# Patient Record
Sex: FEMALE | Race: BLACK OR AFRICAN AMERICAN | NOT HISPANIC OR LATINO | URBAN - METROPOLITAN AREA
[De-identification: names, ages, dates, MRNs, and addresses within clinical notes are randomized per-mention and may not be internally consistent; named-entity substitution may affect disease eponyms.]

---
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
## 2018-06-12 ENCOUNTER — EMERGENCY (EMERGENCY)
Facility: HOSPITAL | Age: 18
LOS: 1 days | Discharge: HOME | End: 2018-06-12
Attending: EMERGENCY MEDICINE | Admitting: EMERGENCY MEDICINE
Payer: MEDICAID

## 2018-06-12 VITALS
RESPIRATION RATE: 18 BRPM | SYSTOLIC BLOOD PRESSURE: 114 MMHG | HEART RATE: 75 BPM | DIASTOLIC BLOOD PRESSURE: 70 MMHG | TEMPERATURE: 98 F | OXYGEN SATURATION: 100 %

## 2018-06-12 PROCEDURE — 99053 MED SERV 10PM-8AM 24 HR FAC: CPT

## 2018-06-12 PROCEDURE — 99283 EMERGENCY DEPT VISIT LOW MDM: CPT | Mod: 25

## 2018-06-12 NOTE — ED ADULT TRIAGE NOTE - CHIEF COMPLAINT QUOTE
C/o wheezing and difficulty breathing x1 week with productive cough and stuffy nose. PMH asthma- home inhalers not working. PMH asthma, anemia. Breathing is equal and unlabored in triage. Speaking in full sentences with no distress.

## 2018-06-13 PROCEDURE — 71046 X-RAY EXAM CHEST 2 VIEWS: CPT | Mod: 26

## 2018-06-13 RX ORDER — LORATADINE 10 MG/1
10 TABLET ORAL DAILY
Qty: 0 | Refills: 0 | Status: DISCONTINUED | OUTPATIENT
Start: 2018-06-13 | End: 2018-06-16

## 2018-06-13 RX ORDER — IPRATROPIUM/ALBUTEROL SULFATE 18-103MCG
3 AEROSOL WITH ADAPTER (GRAM) INHALATION ONCE
Qty: 0 | Refills: 0 | Status: COMPLETED | OUTPATIENT
Start: 2018-06-13 | End: 2018-06-13

## 2018-06-13 RX ORDER — ALBUTEROL 90 UG/1
2 AEROSOL, METERED ORAL
Qty: 16 | Refills: 0 | OUTPATIENT
Start: 2018-06-13 | End: 2018-07-12

## 2018-06-13 RX ORDER — PSEUDOEPHEDRINE HCL 30 MG
30 TABLET ORAL ONCE
Qty: 0 | Refills: 0 | Status: COMPLETED | OUTPATIENT
Start: 2018-06-13 | End: 2018-06-13

## 2018-06-13 RX ADMIN — Medication 30 MILLIGRAM(S): at 02:17

## 2018-06-13 RX ADMIN — Medication 3 MILLILITER(S): at 02:17

## 2018-06-13 NOTE — ED PROVIDER NOTE - MEDICAL DECISION MAKING DETAILS
Cough with h/o asthma: Lungs CTA, comfortable respirations on RA. CXR r/o PNA given duration of Sx. +rhinorrhea. Albuterol PRN. Sudsfed for nasal congestion and anti-histamine for seasonal allergies. f/u PCP.

## 2018-06-13 NOTE — ED ADULT NURSE NOTE - OBJECTIVE STATEMENT
Pt recd A+Ox3. C/o productive cough, stuffy nose, wheezing and difficulty breathing x1 week. [place] and asthma has been flaring up. Home inhalers not relieving SOB or wheezing. Breathing is equal and unlabored on assessment. Awaiting MD bal. Will continue to monitor.

## 2018-06-13 NOTE — ED PROVIDER NOTE - OBJECTIVE STATEMENT
16y/o F with PMHx of asthma (+prior hospitalization, no intubation. On Albuterol inhaler prn), presents to the ED c/o cough x1w with congestion, rhinorrhea, and sore throat. Her cough is intermittently productive. She took DayQuil to some relief, last taken yesterday morning. Pt reports using her Albuterol inhaler qd without relief. She reports her nasal congestion is similar to her regular seasonal allergies. Denies fevers/chills, nausea/vomiting, SOB, any other complaints. NDKA.

## 2019-06-02 ENCOUNTER — EMERGENCY (EMERGENCY)
Facility: HOSPITAL | Age: 19
LOS: 1 days | Discharge: HOME | End: 2019-06-02
Attending: EMERGENCY MEDICINE | Admitting: EMERGENCY MEDICINE
Payer: MEDICAID

## 2019-06-02 VITALS
SYSTOLIC BLOOD PRESSURE: 112 MMHG | OXYGEN SATURATION: 100 % | RESPIRATION RATE: 15 BRPM | DIASTOLIC BLOOD PRESSURE: 69 MMHG | TEMPERATURE: 99 F | HEART RATE: 63 BPM

## 2019-06-02 PROBLEM — J45.909 UNSPECIFIED ASTHMA, UNCOMPLICATED: Chronic | Status: ACTIVE | Noted: 2018-06-13

## 2019-06-02 PROCEDURE — 99283 EMERGENCY DEPT VISIT LOW MDM: CPT | Mod: 25

## 2019-06-02 NOTE — ED PROVIDER NOTE - CLINICAL SUMMARY MEDICAL DECISION MAKING FREE TEXT BOX
18F hx of imperforate hymen presenting with complaint of vaginal pain, will check exam for possible other vaginal issues, if OK patient safe for discharge to outpatient follow up.

## 2019-06-02 NOTE — ED ADULT NURSE NOTE - NSIMPLEMENTINTERV_GEN_ALL_ED
Implemented All Universal Safety Interventions:  [place] to call system. Call bell, personal items and telephone within reach. Instruct patient to call for assistance. Room bathroom lighting operational. Non-slip footwear when patient is off stretcher. Physically safe environment: no spills, clutter or unnecessary equipment. Stretcher in lowest position, wheels locked, appropriate side rails in place.

## 2019-06-02 NOTE — ED PROVIDER NOTE - NS ED ROS FT
ROS:  GENERAL: No fever, no chills  EYES: no change in vision  HEENT: no trouble swallowing, no trouble speaking  CARDIAC: no chest pain  PULMONARY: no cough, no shortness of breath  GI: no abdominal pain, no nausea, no vomiting, no diarrhea, no constipation  : +vaginal mass, No dysuria, no frequency, no change in appearance, or odor of urine  SKIN: no rashes  NEURO: no headache, no weakness  MSK: No joint pain
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
## 2019-06-02 NOTE — ED PROVIDER NOTE - NSFOLLOWUPINSTRUCTIONS_ED_ALL_ED_FT
Follow up with our OB/GYN clinic, call to schedule an appointment. Take acetaminophen (Tylenol) 975mg (3 regular strength tablets or 2 extra strength tablets) as often as every 6 hours as needed for pain. Never take more than 4000mg of acetaminophen/Tylenol in any 24 hour period. Be cautious of over the counter medications as many formularies contain acetaminophen in them. Take ibuprofen (or Motrin) 600mg (3 tablets) up to 4 times per day as needed for pain with food or milk. Return here to the emergency department for any new symptoms of concern such as severe pain, intractable nausea/vomiting, or other new acute worries.

## 2019-06-02 NOTE — ED PROVIDER NOTE - OBJECTIVE STATEMENT
Hx of imperforate hymen presenting for evaluation of vaginal pain, notes she has blue fluid accumulating behind her hymen, had been told by her gyn in the past she would need a procedure to release it but has avoided this but now wants it. Denies fevers, chills, nausea, vomiting, diarrhea, constipation, headache, back pain, or any shortness of breath. Denies any other sx of concern at this time.

## 2019-06-02 NOTE — ED ADULT NURSE NOTE - OBJECTIVE STATEMENT
PT brought into room 16. A&OX4 amb self care female presents to the ed today for pelvic pain, states she was told by her obgyn that her Shameka is unperforated. MD at bedside. Upon exam, blue, emboli is present within her vaginal canal. Patient shameka is dislocated and cervix is present. Awaiting further orders

## 2019-06-02 NOTE — ED PROVIDER NOTE - PHYSICAL EXAMINATION
Gen: NAD, non-toxic, conversational  Eyes: PERRLA, EOMI   HENT: Normocephalic, atraumatic. External ears normal, no rhinorrhea, moist mucous membranes.   CV: RRR, no M/R/G  Resp: CTAB, non-labored, speaking without difficulty on room air  Abd: soft, non tender, non rigid, no guarding or rebound tenderness  Back: No CVAT bilaterally, no midline ttp  Skin: dry, wwp   Neuro: AOx3, speech is fluent and appropriate  Psych: Mood concerned affect euthymic Gen: NAD, non-toxic, conversational  Eyes: PERRLA, EOMI   HENT: Normocephalic, atraumatic. External ears normal, no rhinorrhea, moist mucous membranes.   CV: RRR, no M/R/G  Resp: CTAB, non-labored, speaking without difficulty on room air  Abd: soft, non tender, non rigid, no guarding or rebound tenderness  Back: No CVAT bilaterally, no midline ttp  Pelvic (chaperone: Dr. Art): Spec: cervix is visualized with physiologic discharge. On the 6 o'clock position of the labia minora there is an approximately 2.5 cm long strand with an engorged end that is purple/blue in hue and ttp.   Skin: dry, wwp   Neuro: AOx3, speech is fluent and appropriate  Psych: Mood concerned affect euthymic

## 2019-10-28 ENCOUNTER — APPOINTMENT (OUTPATIENT)
Dept: ORTHOPEDIC SURGERY | Facility: CLINIC | Age: 19
End: 2019-10-28
Payer: MEDICAID

## 2019-10-28 VITALS
BODY MASS INDEX: 28.47 KG/M2 | HEIGHT: 60 IN | DIASTOLIC BLOOD PRESSURE: 68 MMHG | WEIGHT: 145 LBS | HEART RATE: 76 BPM | SYSTOLIC BLOOD PRESSURE: 103 MMHG

## 2019-10-28 DIAGNOSIS — S99.922A UNSPECIFIED INJURY OF LEFT FOOT, INITIAL ENCOUNTER: ICD-10-CM

## 2019-10-28 DIAGNOSIS — S92.902A UNSPECIFIED FRACTURE OF LEFT FOOT, INITIAL ENCOUNTER FOR CLOSED FRACTURE: ICD-10-CM

## 2019-10-28 PROCEDURE — 99203 OFFICE O/P NEW LOW 30 MIN: CPT

## 2019-10-28 PROCEDURE — 73630 X-RAY EXAM OF FOOT: CPT | Mod: LT

## 2019-10-29 ENCOUNTER — FORM ENCOUNTER (OUTPATIENT)
Age: 19
End: 2019-10-29

## 2019-10-29 PROBLEM — S99.922A FOOT INJURY, LEFT, INITIAL ENCOUNTER: Status: ACTIVE | Noted: 2019-10-29

## 2019-10-30 ENCOUNTER — APPOINTMENT (OUTPATIENT)
Dept: CT IMAGING | Facility: CLINIC | Age: 19
End: 2019-10-30
Payer: MEDICAID

## 2019-10-30 ENCOUNTER — OUTPATIENT (OUTPATIENT)
Dept: OUTPATIENT SERVICES | Facility: HOSPITAL | Age: 19
LOS: 1 days | End: 2019-10-30
Payer: SELF-PAY

## 2019-10-30 DIAGNOSIS — Z00.8 ENCOUNTER FOR OTHER GENERAL EXAMINATION: ICD-10-CM

## 2019-10-30 PROCEDURE — 73700 CT LOWER EXTREMITY W/O DYE: CPT | Mod: 26,LT

## 2019-10-30 PROCEDURE — 76376 3D RENDER W/INTRP POSTPROCES: CPT

## 2019-10-30 PROCEDURE — 73700 CT LOWER EXTREMITY W/O DYE: CPT

## 2019-11-04 ENCOUNTER — APPOINTMENT (OUTPATIENT)
Dept: ORTHOPEDIC SURGERY | Facility: CLINIC | Age: 19
End: 2019-11-04
Payer: MEDICAID

## 2019-11-04 DIAGNOSIS — S92.325D NONDISPLACED FRACTURE OF SECOND METATARSAL BONE, LEFT FOOT, SUBSEQUENT ENCOUNTER FOR FRACTURE WITH ROUTINE HEALING: ICD-10-CM

## 2019-11-04 DIAGNOSIS — S99.922D UNSPECIFIED INJURY OF LEFT FOOT, SUBSEQUENT ENCOUNTER: ICD-10-CM

## 2019-11-04 PROCEDURE — 99214 OFFICE O/P EST MOD 30 MIN: CPT

## 2019-11-06 PROBLEM — S92.325D CLOSED NONDISPLACED FRACTURE OF SECOND METATARSAL BONE OF LEFT FOOT WITH ROUTINE HEALING, SUBSEQUENT ENCOUNTER: Status: ACTIVE | Noted: 2019-10-29

## 2019-11-06 PROBLEM — S99.922D FOOT INJURY, LEFT, SUBSEQUENT ENCOUNTER: Status: ACTIVE | Noted: 2019-11-06

## 2019-11-12 ENCOUNTER — APPOINTMENT (OUTPATIENT)
Dept: ORTHOPEDIC SURGERY | Facility: CLINIC | Age: 19
End: 2019-11-12

## 2019-12-02 ENCOUNTER — APPOINTMENT (OUTPATIENT)
Dept: ORTHOPEDIC SURGERY | Facility: CLINIC | Age: 19
End: 2019-12-02

## 2019-12-03 ENCOUNTER — APPOINTMENT (OUTPATIENT)
Dept: ORTHOPEDIC SURGERY | Facility: CLINIC | Age: 19
End: 2019-12-03
Payer: MEDICAID

## 2019-12-03 PROCEDURE — 99214 OFFICE O/P EST MOD 30 MIN: CPT

## 2019-12-03 PROCEDURE — 73630 X-RAY EXAM OF FOOT: CPT | Mod: LT

## 2019-12-30 ENCOUNTER — APPOINTMENT (OUTPATIENT)
Dept: ORTHOPEDIC SURGERY | Facility: CLINIC | Age: 19
End: 2019-12-30

## 2020-01-28 ENCOUNTER — APPOINTMENT (OUTPATIENT)
Dept: ORTHOPEDIC SURGERY | Facility: CLINIC | Age: 20
End: 2020-01-28
Payer: MEDICAID

## 2020-01-28 DIAGNOSIS — S92.322D DISPLACED FRACTURE OF SECOND METATARSAL BONE, LEFT FOOT, SUBSEQUENT ENCOUNTER FOR FRACTURE WITH ROUTINE HEALING: ICD-10-CM

## 2020-01-28 DIAGNOSIS — S92.342D DISPLACED FRACTURE OF FOURTH METATARSAL BONE, LEFT FOOT, SUBSEQUENT ENCOUNTER FOR FRACTURE WITH ROUTINE HEALING: ICD-10-CM

## 2020-01-28 DIAGNOSIS — S92.332D DISPLACED FRACTURE OF THIRD METATARSAL BONE, LEFT FOOT, SUBSEQUENT ENCOUNTER FOR FRACTURE WITH ROUTINE HEALING: ICD-10-CM

## 2020-01-28 PROCEDURE — 73630 X-RAY EXAM OF FOOT: CPT | Mod: LT

## 2020-01-28 PROCEDURE — 99214 OFFICE O/P EST MOD 30 MIN: CPT

## 2020-02-03 ENCOUNTER — APPOINTMENT (OUTPATIENT)
Dept: ORTHOPEDIC SURGERY | Facility: CLINIC | Age: 20
End: 2020-02-03
Payer: MEDICAID

## 2020-04-28 ENCOUNTER — APPOINTMENT (OUTPATIENT)
Dept: ORTHOPEDIC SURGERY | Facility: CLINIC | Age: 20
End: 2020-04-28

## 2020-12-13 ENCOUNTER — LABORATORY RESULT (OUTPATIENT)
Age: 20
End: 2020-12-13

## 2020-12-14 ENCOUNTER — APPOINTMENT (OUTPATIENT)
Dept: OBGYN | Facility: CLINIC | Age: 20
End: 2020-12-14
Payer: MEDICAID

## 2020-12-14 ENCOUNTER — LABORATORY RESULT (OUTPATIENT)
Age: 20
End: 2020-12-14

## 2020-12-14 ENCOUNTER — OUTPATIENT (OUTPATIENT)
Dept: OUTPATIENT SERVICES | Facility: HOSPITAL | Age: 20
LOS: 1 days | End: 2020-12-14
Payer: MEDICAID

## 2020-12-14 VITALS — TEMPERATURE: 97.3 F

## 2020-12-14 DIAGNOSIS — N76.0 ACUTE VAGINITIS: ICD-10-CM

## 2020-12-14 DIAGNOSIS — N94.6 DYSMENORRHEA, UNSPECIFIED: ICD-10-CM

## 2020-12-14 DIAGNOSIS — Z00.00 ENCOUNTER FOR GENERAL ADULT MEDICAL EXAMINATION W/OUT ABNORMAL FINDINGS: ICD-10-CM

## 2020-12-14 DIAGNOSIS — R63.5 ABNORMAL WEIGHT GAIN: ICD-10-CM

## 2020-12-14 DIAGNOSIS — Z80.41 FAMILY HISTORY OF MALIGNANT NEOPLASM OF OVARY: ICD-10-CM

## 2020-12-14 DIAGNOSIS — Z11.3 ENCOUNTER FOR SCREENING FOR INFECTIONS WITH A PREDOMINANTLY SEXUAL MODE OF TRANSMISSION: ICD-10-CM

## 2020-12-14 DIAGNOSIS — N92.0 EXCESSIVE AND FREQUENT MENSTRUATION WITH REGULAR CYCLE: ICD-10-CM

## 2020-12-14 DIAGNOSIS — F17.290 NICOTINE DEPENDENCE, OTHER TOBACCO PRODUCT, UNCOMPLICATED: ICD-10-CM

## 2020-12-14 PROCEDURE — G0463: CPT

## 2020-12-14 PROCEDURE — 87340 HEPATITIS B SURFACE AG IA: CPT

## 2020-12-14 PROCEDURE — 86803 HEPATITIS C AB TEST: CPT

## 2020-12-14 PROCEDURE — 87491 CHLMYD TRACH DNA AMP PROBE: CPT

## 2020-12-14 PROCEDURE — 36415 COLL VENOUS BLD VENIPUNCTURE: CPT | Mod: NC

## 2020-12-14 PROCEDURE — 36514 APHERESIS PLASMA: CPT

## 2020-12-14 PROCEDURE — 87591 N.GONORRHOEAE DNA AMP PROB: CPT

## 2020-12-14 PROCEDURE — 88175 CYTOPATH C/V AUTO FLUID REDO: CPT

## 2020-12-14 PROCEDURE — 86780 TREPONEMA PALLIDUM: CPT

## 2020-12-14 PROCEDURE — 99203 OFFICE O/P NEW LOW 30 MIN: CPT

## 2020-12-14 PROCEDURE — 87389 HIV-1 AG W/HIV-1&-2 AB AG IA: CPT

## 2020-12-15 LAB
C TRACH RRNA SPEC QL NAA+PROBE: DETECTED
C TRACH+GC RRNA SPEC QL PROBE: SIGNIFICANT CHANGE UP
HBV SURFACE AG SER-ACNC: SIGNIFICANT CHANGE UP
HCV AB S/CO SERPL IA: 0.11 S/CO — SIGNIFICANT CHANGE UP (ref 0–0.99)
HCV AB SERPL-IMP: SIGNIFICANT CHANGE UP
HIV 1+2 AB+HIV1 P24 AG SERPL QL IA: SIGNIFICANT CHANGE UP
N GONORRHOEA RRNA SPEC QL NAA+PROBE: SIGNIFICANT CHANGE UP
T PALLIDUM AB TITR SER: NEGATIVE — SIGNIFICANT CHANGE UP

## 2020-12-16 ENCOUNTER — NON-APPOINTMENT (OUTPATIENT)
Age: 20
End: 2020-12-16
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
## 2020-12-16 NOTE — PHYSICAL EXAM
[Appropriately responsive] : appropriately responsive [No Lymphadenopathy] : no lymphadenopathy [Regular Rate Rhythm] : regular rate rhythm [Clear to Auscultation B/L] : clear to auscultation bilaterally [Soft] : soft [Non-tender] : non-tender [Non-distended] : non-distended [No HSM] : No HSM [No Lesions] : no lesions [No Mass] : no mass [Oriented x3] : oriented x3 [Examination Of The Breasts] : a normal appearance [No Discharge] : no discharge [No Masses] : no breast masses were palpable [Labia Majora] : normal [Labia Minora] : normal [Normal] : normal [FreeTextEntry6] : normal exam

## 2020-12-16 NOTE — REVIEW OF SYSTEMS
[Fatigue] : fatigue [Recent Weight Gain (___ Lbs)] : recent [unfilled] ~Ulb weight gain [Abdominal Pain] : abdominal pain [Constipation] : constipation [Fainting] : fainting [History of Anemia] : history of anemia [Negative] : Endocrine [Fever] : no fever [Chills] : no chills [Poor Appetite] : appetite not poor [Night Sweats] : no night sweats [Diarrhea] : diarrhea [Heartburn] : no heartburn [Vomiting] : no vomiting [Melena] : no melena [Bloating] : no bloating [Nausea] : no nausea [Headache] : no headache [Dizziness] : no dizziness [Convulsions] : no convulsions [FreeTextEntry6] : asthma
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
## 2020-12-17 RX ORDER — AZITHROMYCIN 500 MG/1
500 TABLET, FILM COATED ORAL
Qty: 8 | Refills: 0 | Status: ACTIVE | COMMUNITY
Start: 2020-12-16 | End: 1900-01-01

## 2020-12-21 ENCOUNTER — APPOINTMENT (OUTPATIENT)
Dept: OBGYN | Facility: CLINIC | Age: 20
End: 2020-12-21
Payer: MEDICAID

## 2020-12-21 ENCOUNTER — OUTPATIENT (OUTPATIENT)
Dept: OUTPATIENT SERVICES | Facility: HOSPITAL | Age: 20
LOS: 1 days | End: 2020-12-21
Payer: MEDICAID

## 2020-12-21 VITALS — TEMPERATURE: 97.5 F

## 2020-12-21 DIAGNOSIS — A74.9 CHLAMYDIAL INFECTION, UNSPECIFIED: ICD-10-CM

## 2020-12-21 DIAGNOSIS — N76.0 ACUTE VAGINITIS: ICD-10-CM

## 2020-12-21 PROCEDURE — 99213 OFFICE O/P EST LOW 20 MIN: CPT

## 2020-12-21 PROCEDURE — G0463: CPT

## 2020-12-28 ENCOUNTER — APPOINTMENT (OUTPATIENT)
Dept: ULTRASOUND IMAGING | Facility: CLINIC | Age: 20
End: 2020-12-28
Payer: MEDICAID

## 2020-12-28 ENCOUNTER — OUTPATIENT (OUTPATIENT)
Dept: OUTPATIENT SERVICES | Facility: HOSPITAL | Age: 20
LOS: 1 days | End: 2020-12-28
Payer: MEDICAID

## 2020-12-28 ENCOUNTER — RESULT REVIEW (OUTPATIENT)
Age: 20
End: 2020-12-28

## 2020-12-28 DIAGNOSIS — N92.0 EXCESSIVE AND FREQUENT MENSTRUATION WITH REGULAR CYCLE: ICD-10-CM

## 2020-12-28 PROCEDURE — 76856 US EXAM PELVIC COMPLETE: CPT | Mod: 26

## 2020-12-28 PROCEDURE — 76830 TRANSVAGINAL US NON-OB: CPT | Mod: 26

## 2020-12-28 PROCEDURE — 76856 US EXAM PELVIC COMPLETE: CPT

## 2020-12-28 PROCEDURE — 76830 TRANSVAGINAL US NON-OB: CPT

## 2020-12-29 NOTE — HISTORY OF PRESENT ILLNESS
[No] : Patient does not have concerns regarding sex [Currently Active] : currently active [Men] : men [Vaginal] : vaginal [TextBox_4] : POS chlamydia culture   [FreeTextEntry1] : 12/9

## 2020-12-29 NOTE — DISCUSSION/SUMMARY
[FreeTextEntry1] : 21 y/o  [place]  received phone call  re chlamydia culture pos  - Was given azithromyocin pt and partner  \par Has only been taking  1 tab PO qday was confused  \par Pt  instructed  to finish wthe remainder of the 4 tabs today and partner as well \par stressed  the need for condoms  for  Safe sex  \par \par Will start Aviane  OVp for BC w/ next menses  \par \par F/u 6 weks for RUBIN  \par \par IDANIA Su PAC

## 2021-03-24 ENCOUNTER — EMERGENCY (EMERGENCY)
Facility: HOSPITAL | Age: 21
LOS: 1 days | Discharge: HOME | End: 2021-03-24
Attending: EMERGENCY MEDICINE | Admitting: EMERGENCY MEDICINE
Payer: MEDICAID

## 2021-03-24 VITALS
SYSTOLIC BLOOD PRESSURE: 122 MMHG | HEIGHT: 60 IN | DIASTOLIC BLOOD PRESSURE: 69 MMHG | OXYGEN SATURATION: 100 % | HEART RATE: 65 BPM | RESPIRATION RATE: 16 BRPM | TEMPERATURE: 98 F

## 2021-03-24 VITALS
DIASTOLIC BLOOD PRESSURE: 70 MMHG | HEART RATE: 62 BPM | RESPIRATION RATE: 66 BRPM | OXYGEN SATURATION: 99 % | SYSTOLIC BLOOD PRESSURE: 102 MMHG

## 2021-03-24 PROCEDURE — 99284 EMERGENCY DEPT VISIT MOD MDM: CPT

## 2021-03-24 RX ORDER — METOCLOPRAMIDE HCL 10 MG
10 TABLET ORAL ONCE
Refills: 0 | Status: COMPLETED | OUTPATIENT
Start: 2021-03-24 | End: 2021-03-24

## 2021-03-24 RX ORDER — SODIUM CHLORIDE 9 MG/ML
1000 INJECTION INTRAMUSCULAR; INTRAVENOUS; SUBCUTANEOUS ONCE
Refills: 0 | Status: COMPLETED | OUTPATIENT
Start: 2021-03-24 | End: 2021-03-24

## 2021-03-24 RX ORDER — ACETAMINOPHEN 500 MG
975 TABLET ORAL ONCE
Refills: 0 | Status: COMPLETED | OUTPATIENT
Start: 2021-03-24 | End: 2021-03-24

## 2021-03-24 RX ADMIN — SODIUM CHLORIDE 1000 MILLILITER(S): 9 INJECTION INTRAMUSCULAR; INTRAVENOUS; SUBCUTANEOUS at 08:54

## 2021-03-24 RX ADMIN — Medication 975 MILLIGRAM(S): at 08:53

## 2021-03-24 RX ADMIN — Medication 10 MILLIGRAM(S): at 08:54
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
## 2021-03-24 NOTE — ED PROVIDER NOTE - PROGRESS NOTE DETAILS
TYLER Palmer: Pt reassessed, feeling better, headache resolved. Will dc w/ neuro follow up if sx persist.
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
## 2021-03-24 NOTE — ED PROVIDER NOTE - PATIENT PORTAL LINK FT
You can access the FollowMyHealth Patient Portal offered by [facility] by registering at the following website: [email or web address]. By joining [employer]’s FollowMyHealth portal, you will also be able to view your health information using other applications (apps) compatible with our system.

## 2021-03-24 NOTE — ED PROVIDER NOTE - CROS ED NEURO NEG
no numbness, no tingling, and no weakness. no numbness, no tingling, and no weakness./no difficulty walking/imbalance

## 2021-03-24 NOTE — ED PROVIDER NOTE - NSFOLLOWUPINSTRUCTIONS_ED_ALL_ED_FT
Head Injury    WHAT YOU NEED TO KNOW:    A head injury can include your scalp, face, skull, or brain and range from mild to severe. Effects can appear immediately after the injury or develop later. The effects may last a short time or be permanent. Healthcare providers may want to check your recovery over time. Treatment may change as you recover or develop new health problems from the head injury.    DISCHARGE INSTRUCTIONS:    Call your local emergency number (911 in the ), or have someone else call if:   •You cannot be woken.      •You have a seizure.      •You stop responding to others or you faint.      •You have blurry or double vision.      •Your speech becomes slurred or confused.      •You have arm or leg weakness, loss of feeling, or new problems with coordination.      •Your pupils are larger than usual, or one pupil is a different size than the other.      •You have blood or clear fluid coming out of your ears or nose.      Seek care immediately if:   •You have repeated or forceful vomiting.      •You feel confused.      •Your headache gets worse or becomes severe.      •You or someone caring for you notices that you are harder to wake than usual.      Call your doctor if:   •Your symptoms last longer than 6 weeks after the injury.      •You have questions or concerns about your condition or care.      Medicines:   •Acetaminophen decreases pain and fever. It is available without a doctor's order. Ask how much to take and how often to take it. Follow directions. Read the labels of all other medicines you are using to see if they also contain acetaminophen, or ask your doctor or pharmacist. Acetaminophen can cause liver damage if not taken correctly. Do not use more than 4 grams (4,000 milligrams) total of acetaminophen in one day.       •Take your medicine as directed. Contact your healthcare provider if you think your medicine is not helping or if you have side effects. Tell him or her if you are allergic to any medicine. Keep a list of the medicines, vitamins, and herbs you take. Include the amounts, and when and why you take them. Bring the list or the pill bottles to follow-up visits. Carry your medicine list with you in case of an emergency.      Self-care:   •Rest or do quiet activities. Limit your time watching TV, using the computer, or doing tasks that require a lot of thinking. Slowly return to your normal activities as directed. Do not play sports or do activities that may cause you to get hit in the head. Ask your healthcare provider when you can return to sports.      •Apply ice on your head for 15 to 20 minutes every hour or as directed. Use an ice pack, or put crushed ice in a plastic bag. Cover it with a towel before you apply it to your skin. Ice helps prevent tissue damage and decreases swelling and pain.      •Have someone stay with you for 24 hours , or as directed. This person can monitor you for problems and call for help if needed. When you are awake, the person should ask you a few questions every few hours to see if you are thinking clearly. An example is to ask your name or address.      Prevent another head injury:   •Wear a helmet that fits properly. Do this when you play sports, or ride a bike, scooter, or skateboard. Helmets help decrease your risk for a serious head injury. Talk to your healthcare provider about other ways you can protect yourself if you play sports.      •Wear your seatbelt every time you are in a car. This helps lower your risk for a head injury if you are in a car accident.      Follow up with your doctor as directed: Write down your questions so you remember to ask them during your visits. Head Injury     WHAT YOU NEED TO KNOW:    A head injury can include your scalp, face, skull, or brain and range from mild to severe. Effects can appear immediately after the injury or develop later. The effects may last a short time or be permanent. Healthcare providers may want to check your recovery over time. Treatment may change as you recover or develop new health problems from the head injury.    DISCHARGE INSTRUCTIONS:    Call your local emergency number (911 in the ), or have someone else call if:   •You cannot be woken.      •You have a seizure.      •You stop responding to others or you faint.      •You have blurry or double vision.      •Your speech becomes slurred or confused.      •You have arm or leg weakness, loss of feeling, or new problems with coordination.      •Your pupils are larger than usual, or one pupil is a different size than the other.      •You have blood or clear fluid coming out of your ears or nose.      Seek care immediately if:   •You have repeated or forceful vomiting.      •You feel confused.      •Your headache gets worse or becomes severe.      •You or someone caring for you notices that you are harder to wake than usual.      Call your doctor if:   •Your symptoms last longer than 6 weeks after the injury.      •You have questions or concerns about your condition or care.      Medicines:   •Acetaminophen decreases pain and fever. It is available without a doctor's order. Ask how much to take and how often to take it. Follow directions. Read the labels of all other medicines you are using to see if they also contain acetaminophen, or ask your doctor or pharmacist. Acetaminophen can cause liver damage if not taken correctly. Do not use more than 4 grams (4,000 milligrams) total of acetaminophen in one day.       •Take your medicine as directed. Contact your healthcare provider if you think your medicine is not helping or if you have side effects. Tell him or her if you are allergic to any medicine. Keep a list of the medicines, vitamins, and herbs you take. Include the amounts, and when and why you take them. Bring the list or the pill bottles to follow-up visits. Carry your medicine list with you in case of an emergency.      Self-care:   •Rest or do quiet activities. Limit your time watching TV, using the computer, or doing tasks that require a lot of thinking. Slowly return to your normal activities as directed. Do not play sports or do activities that may cause you to get hit in the head. Ask your healthcare provider when you can return to sports.      •Apply ice on your head for 15 to 20 minutes every hour or as directed. Use an ice pack, or put crushed ice in a plastic bag. Cover it with a towel before you apply it to your skin. Ice helps prevent tissue damage and decreases swelling and pain.      •Have someone stay with you for 24 hours , or as directed. This person can monitor you for problems and call for help if needed. When you are awake, the person should ask you a few questions every few hours to see if you are thinking clearly. An example is to ask your name or address.      Prevent another head injury:   •Wear a helmet that fits properly. Do this when you play sports, or ride a bike, scooter, or skateboard. Helmets help decrease your risk for a serious head injury. Talk to your healthcare provider about other ways you can protect yourself if you play sports.      •Wear your seatbelt every time you are in a car. This helps lower your risk for a head injury if you are in a car accident.      Follow up with your doctor as directed: Write down your questions so you remember to ask them during your visits.

## 2021-03-24 NOTE — ED PROVIDER NOTE - CLINICAL SUMMARY MEDICAL DECISION MAKING FREE TEXT BOX
21 y/o F presents with intermittent left-sided headache s/p head injury, likely secondary to concussion. Given normal exam no need for imaging at this time. Plan to treat symptomatically and reassess.

## 2021-09-09 NOTE — ED ADULT NURSE NOTE - NS_SISCREENINGSR_GEN_ALL_ED
PEDIATRIC ILLNESS VISIT   9/9/2021        Roomed by: Veena Lang MD 10:03 AM      SUBJECTIVE   accompanied by father  Verna is a 30 month old female who is complaining of loose stools for two weeks, yellowy in color.  Started with irriated skin at the anal opening, this was noted at school .  Had blow outs at school.    BACKGROUND  August 3, fever runny nose cough.  COVID negative . Was seen in convenient care.  Then she recovered.    Then Hand Foot Mouth  August 16 now fully recovered.   Had diapr rash but now improved.       . Symptoms:  Fever: no fever  Review of Systems   Constitutional: Negative for activity change and appetite change.   HENT: Positive for rhinorrhea.    Eyes: Negative.    Respiratory: Negative.    Cardiovascular: Negative.    Gastrointestinal: Positive for diarrhea (green watery.).     Allergies:  ALLERGIES:  No Known Allergies    Current Outpatient Medications   Medication Sig Dispense Refill   • albuterol (VENTOLIN) (2.5 MG/3ML) 0.083% nebulizer solution Take 3 mLs by nebulization every 4 hours as needed for Wheezing or Shortness of Breath. Please start twice a day to start and then go as needed in between for 5 days 375 mL 11   • sodium chloride 0.9 % nebulizer solution Every 4-6 as needed for congestion 150 mL 5   • permethrin (ACTICIN) 5 % cream Apply and massage in cream from head to toe ; leave on for 8 to 14 hours before washing off with water; can repeat in 14 days if needed 60 g 0   • hydroCORTisone (CORTIZONE) 2.5 % cream Apply Bid prn itchiness 30 g 2     No current facility-administered medications for this visit.       No family history on file.  Family history of lactose intoleance.     Social history:   Attends     OBJECTIVE:   Visit Vitals  Pulse 116   Temp 98.3 °F (36.8 °C) (Temporal)   Resp 24   Wt 14.3 kg (31 lb 9.6 oz)     Physical Exam  Vitals reviewed.   Constitutional:       General: She is active.      Appearance: She is well-developed.   HENT:       Head: Atraumatic.      Right Ear: Tympanic membrane normal.      Left Ear: Tympanic membrane normal.      Nose: Nose normal.      Mouth/Throat:      Mouth: Mucous membranes are moist.      Pharynx: Oropharynx is clear.   Eyes:      Conjunctiva/sclera: Conjunctivae normal.      Pupils: Pupils are equal, round, and reactive to light.   Cardiovascular:      Rate and Rhythm: Normal rate and regular rhythm.      Pulses: Pulses are strong.      Heart sounds: S1 normal and S2 normal. No murmur heard.     Pulmonary:      Effort: Pulmonary effort is normal. No respiratory distress.      Breath sounds: Normal breath sounds.   Abdominal:      General: Abdomen is flat. There is no distension.      Palpations: Abdomen is soft. There is no mass.      Tenderness: There is no abdominal tenderness.      Comments: Increased gas sounds throughout   Genitourinary:     Vagina: No erythema.   Musculoskeletal:         General: No deformity or signs of injury. Normal range of motion.      Cervical back: Normal range of motion.   Lymphadenopathy:      Cervical: No cervical adenopathy.   Skin:     General: Skin is warm.      Capillary Refill: Capillary refill takes less than 2 seconds.   Neurological:      Mental Status: She is alert.      Sensory: No sensory deficit.      Motor: No abnormal muscle tone.      Coordination: Coordination normal.      Deep Tendon Reflexes: Reflexes normal.         ASSESSMENT/PLAN    Diarrhea -  watch for signs of dehydration, trial of probiotics , discussedno diary and to give more of a starchy diet for now.   No orders of the defined types were placed in this encounter.      If not improving in 3 days will have stool cultures ordered.   Medication changes: No    Immunizations given today? No  See Orders:  Instructed to call if the problem worsens or does not improve within the next 24 to 48 hours.    Schedule follow-up: agata Lang MD         Negative

## 2021-11-01 ENCOUNTER — EMERGENCY (EMERGENCY)
Facility: HOSPITAL | Age: 21
LOS: 1 days | Discharge: HOME | End: 2021-11-01
Attending: EMERGENCY MEDICINE | Admitting: EMERGENCY MEDICINE
Payer: MEDICAID

## 2021-11-01 VITALS
TEMPERATURE: 98 F | DIASTOLIC BLOOD PRESSURE: 71 MMHG | SYSTOLIC BLOOD PRESSURE: 123 MMHG | RESPIRATION RATE: 17 BRPM | OXYGEN SATURATION: 98 % | HEART RATE: 78 BPM

## 2021-11-01 VITALS
SYSTOLIC BLOOD PRESSURE: 105 MMHG | HEART RATE: 73 BPM | RESPIRATION RATE: 16 BRPM | HEIGHT: 60 IN | DIASTOLIC BLOOD PRESSURE: 66 MMHG | TEMPERATURE: 98 F

## 2021-11-01 LAB

## 2021-11-01 PROCEDURE — 71046 X-RAY EXAM CHEST 2 VIEWS: CPT | Mod: 26

## 2021-11-01 PROCEDURE — 99284 EMERGENCY DEPT VISIT MOD MDM: CPT

## 2021-11-01 RX ORDER — IPRATROPIUM/ALBUTEROL SULFATE 18-103MCG
3 AEROSOL WITH ADAPTER (GRAM) INHALATION ONCE
Refills: 0 | Status: COMPLETED | OUTPATIENT
Start: 2021-11-01 | End: 2021-11-01

## 2021-11-01 RX ORDER — ALBUTEROL 90 UG/1
2 AEROSOL, METERED ORAL
Qty: 1 | Refills: 0
Start: 2021-11-01 | End: 2021-11-01

## 2021-11-01 RX ADMIN — Medication 3 MILLILITER(S): at 06:26

## 2021-11-01 NOTE — ED ADULT NURSE NOTE - OBJECTIVE STATEMENT
22 y/o female, a&ox4, received to rm 12 with c/o shortness of breath and difficulty breathing for the past week. Pt describes pain as a "feather stuck in her throat that hurts when breathes in." Pmh includes asthma, uses inhaler which provides temporary relief. Pt reports epigastric pain yesterday. Pt denies CP, SOB, and difficulty breathing at moment because she used her inhaler before entering ER. Stable Vs noted. MD Jiang reports equal and clear lung sounds. 20 y/o female, a&ox4, received to rm 12 with c/o shortness of breath and difficulty breathing for the past week. Pt describes pain as a "feather stuck in her throat that hurts when breathes in." MD Jiang reports equal and clear lung sounds. No use of accessory muscles, abdominal breathing or labored breathing on assessment. Pt NSR on cardiac monitor. Pmh includes asthma, uses inhaler which provides temporary relief. Pt reports epigastric pain yesterday. Pt denies CP, SOB, and difficulty breathing at moment because she used her inhaler before entering ER. Stable Vs noted.

## 2021-11-01 NOTE — ED PROVIDER NOTE - ATTENDING CONTRIBUTION TO CARE
I have seen and examined the patient on the patient´s visit date. I have reviewed the note written by Je Jiang DO, on that visit day. I have supervised and participated as necessary in the performance of procedures indicated for patient management and was available at all phases of the patient´s visit when needed. We discussed the history, physical exam findings, management plan, and  medical decision making. I have made my additions, exceptions, and revisions within the chart and I agree with H and P as documented in its entirety. The data and my interpretation of any data collected from labs, interventions and imaging appear below as well as my independent medical decision making and considerations.    The patient is a 21y Female who has a past medical and surgery history of Asthma PTED c/o 1 week of  throat pain SOB and CP w/inspiration without cough sputum  hemoptysis. She denies fever or chills or myalgias    Vital Signs Last 24 Hrs  T(F): 98.1 HR: 73 BP: 105/66 RR: 16 (01 Nov 2021 04:37)   PE: as described; my additions and exceptions are noted in the chart    DATA:  EKG: c/w 2010 EKG   LAB: Pending at time of evaluation    IMPRESSION/RISK:  Dx=  Differential includes but not limited to conditions listed in order of most possible first:   Consideration include  Plan I have seen and examined the patient on the patient´s visit date. I have reviewed the note written by Je Jiang DO, on that visit day. I have supervised and participated as necessary in the performance of procedures indicated for patient management and was available at all phases of the patient´s visit when needed. We discussed the history, physical exam findings, management plan, and  medical decision making. I have made my additions, exceptions, and revisions within the chart and I agree with H and P as documented in its entirety. The data and my interpretation of any data collected from labs, interventions and imaging appear below as well as my independent medical decision making and considerations.    The patient is a 21y unvaccinated Female who has a past medical and surgery history of Asthma PTED c/o 1 week of  throat pain SOB and CP w/inspiration without cough sputum  hemoptysis. She denies fever or chills or myalgias    Vital Signs Last 24 Hrs  T(F): 98.1 HR: 73 BP: 105/66 RR: 16 (01 Nov 2021 04:37)   PE: as described; my additions and exceptions are noted in the chart    IMPRESSION/RISK:  Dx=SOB  Consideration include probable inadequately treated asthma in patient with poor regimen and enviromental status/situation (work in a [employer]); also concern RE vaccination status   Plan  nebs  cxr  reassess  will d/c with followup   RTED PRN

## 2021-11-01 NOTE — ED PROVIDER NOTE - NSFOLLOWUPCLINICS_GEN_ALL_ED_FT
[facility] Pulmonolgy and Sleep Medicine  Pulmonology  [address] [place] [address], [facility] [address]  [place] [address]  Phone: [phone]  Fax:   Follow Up Time: 1-3 Days

## 2021-11-01 NOTE — ED PROVIDER NOTE - PHYSICAL EXAMINATION
GENERAL: Awake, alert, NAD  HEENT: NC/AT, moist mucous membranes, PERRL, EOMI, no pharyngeal exudate, no cervical lymphadenopathy   LUNGS: CTAB, no wheezes or crackles   CARDIAC: RRR, no m/r/g  ABDOMEN: Soft, , non tender, non distended, no rebound, no guarding  EXT: No edema, no calf tenderness,   NEURO: A&Ox3. Moving all extremities.  SKIN: Warm and dry. No rash.  PSYCH: Normal affect. T(C): 36.5 (01 Nov 2021 05:52), Max: 36.7 (01 Nov 2021 04:37)  T(F): 97.7 (01 Nov 2021 05:52), Max: 98.1 (01 Nov 2021 04:37)  HR: 78 (01 Nov 2021 05:52) (73 - 78)  BP: 123/71 (01 Nov 2021 05:52) (105/66 - 123/71)  BP(mean): --  ABP: --  ABP(mean): --  RR: 17 (01 Nov 2021 05:52) (16 - 17)  SpO2: 98% (01 Nov 2021 05:52) (98% - 98%)    GENERAL: Awake, alert, NAD  HEENT: NC/AT, moist mucous membranes, PERRL, EOMI, no pharyngeal exudate, no cervical lymphadenopathy   LUNGS: CTAB, no wheezes or crackles   CARDIAC: RRR, no m/r/g  ABDOMEN: Soft, , non tender, non distended, no rebound, no guarding  EXT: No edema, no calf tenderness,   NEURO: A&Ox3. Moving all extremities.  SKIN: Warm and dry. No rash.  PSYCH: Normal affect.

## 2021-11-01 NOTE — ED PROVIDER NOTE - PROGRESS NOTE DETAILS
pt receiving duoneb currently, states she feels sob is better but it is still not feeling 100% back to normal

## 2021-11-01 NOTE — ED PROVIDER NOTE - PATIENT PORTAL LINK FT
You can access the FollowMyHealth Patient Portal offered by [facility] by registering at the following website: [email or web address]. By joining [facility]’s FollowMyHealth portal, you will also be able to view your health information using other applications (apps) compatible with our system.

## 2021-11-01 NOTE — ED PROVIDER NOTE - CLINICAL SUMMARY MEDICAL DECISION MAKING FREE TEXT BOX
21F PMH Asthma CC SOB Given hx and physical possible viral etiolgy w/ asthma excerbation Will get xray 2 view, give duoneb and urine preg test

## 2021-11-01 NOTE — ED ADULT NURSE NOTE - PAIN: PRESENCE, MLM
denies pain/discomfort [FreeTextEntry1] : He states that his tremor is unchanged since his visit. He is on keppra now per Dr. Koeppel and off vimpat. He is able to use a spoon with his right hand as well as hold a cup without spilling. Shaving remains difficult He recharges his battery every other day \par \par \par Meds\par GBP:400mg TID\par Lev 750mg BID

## 2021-11-01 NOTE — ED PROVIDER NOTE - OBJECTIVE STATEMENT
21F PMH asthma CC sob for the past week. PT states her SOB feels like she is she has to breathe faster. PT states her symptoms started w/ a tickle in her throat, now has been having dry cough. PT takes wixela when she feels the symptoms and states she has to be taking it more frequently. Pt spoke to PCP earlier in the week and was told if symptoms persist, to come to the ED. 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  Chest pain located over sternum, last episode was yesterday, only occurred during inspiration.   Not covid vaccinated  No sick contacts or recent travel   Denies any fever, chills n/v   LMP today 21F PMH asthma CC sob for the past week. PT states her SOB feels like she is she has to breathe faster. PT states her symptoms started w/ a tickle in her throat, now has been having dry cough. PT takes wixela when she feels the symptoms and states she has to be taking it more frequently. Pt spoke to PCP earlier in the week and was told if symptoms persist, to come to the ED. 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  Chest pain located over sternum, last episode was yesterday, only occurred during inspiration.   No pain with swallowing   Not covid vaccinated  No sick contacts or recent travel   Denies any fever, chills n/v   LMP today

## 2021-11-01 NOTE — ED ADULT TRIAGE NOTE - CHIEF COMPLAINT QUOTE
Pt. c/o throat pain and sob and chest discomfort with inspiration x 1 week. Denies cough, body aches or fevers. hx Asthma

## 2021-11-01 NOTE — ED PROVIDER NOTE - NSFOLLOWUPINSTRUCTIONS_ED_ALL_ED_FT
Today you were seen in the ED for shortness of breath    It was found that you have    Please follow up with your PCP or a pulmonologist in regards to the further management of your asthma.     Please return to the ED if you have any of the following: Today you were seen in the ED for shortness of breath    It was found that you have    Please follow up with your PCP or a pulmonologist in regards to the further management of your asthma. The information to make an appointment is below.      Please return to the ED if you have any of the following: Today you were seen in the ED for shortness of breath    It was found that you have no pneumonia, but most likely a asthma exacerbation.     Please follow up with your PCP or a pulmonologist in regards to the further management of your asthma. The information to make an appointment is below.      Please return to the ED if you have any of the following: increase of shortness of breath, inability to take full breaths,    [facility] Internal Medicine  General Internal Medicine  [address] [place] [address]  Phone: [phone]  Fax:     [facility]  Internal Medicine  [address] [place] [address]  Phone: [phone]  Fax: [phone]    Asthma WHAT YOU NEED TO KNOW:    Asthma is a lung disease that makes breathing difficult. Chronic inflammation and reactions to triggers narrow the airways in the lungs. Asthma can become life-threatening if it is not managed.  Normal vs Asthmatic Bronchioles Adult    DISCHARGE INSTRUCTIONS:    Call your local emergency number (911 in the ) if:    You have severe shortness of breath.    The skin around your neck and ribs pulls in with each breath.    Your peak flow numbers are in the red zone of your AAP.  Seek care immediately if:    You have shortness of breath, even after you take your short-term medicine as directed.    Your lips or nails turn blue or gray.  Call your doctor or asthma specialist if:    You run out of medicine before your next refill is due.    Your symptoms get worse.    You need to take more medicine than usual to control your symptoms.    You have questions or concerns about your condition or care.  Medicines:    Medicines may be used to decrease inflammation, open airways, and make it easier to breathe. Medicines may be inhaled, taken as a pill, or injected. Short-term medicines relieve your symptoms quickly. Long-term medicines are used to prevent future asthma attacks. Other medicines may be needed if your regular medicines are not able to prevent attacks. You may also need medicine to help control your allergies. Ask your healthcare provider for more information about any medicine you are given and how to take it safely.    Take your medicine as directed. Contact your healthcare provider if you think your medicine is not helping or if you have side effects. Tell him or her if you are allergic to any medicine. Keep a list of the medicines, vitamins, and herbs you take. Include the amounts, and when and why you take them. Bring the list or the pill bottles to follow-up visits. Carry your medicine list with you in case of an emergency.  Manage your symptoms and prevent future attacks:    Follow your Asthma Action Plan (AAP). This is a written plan that you and your healthcare provider create. It explains which medicine you need and when to change doses if necessary. It also explains how you can monitor symptoms and use a peak flow meter. The meter measures how well your lungs are working.    Manage other health conditions, such as allergies, acid reflux, and sleep apnea.    Identify and avoid triggers. These may include pets, dust mites, mold, and cockroaches.    Do not smoke or be around others who smoke. Nicotine and other chemicals in cigarettes and cigars can cause lung damage. Ask your healthcare provider for information if you currently smoke and need help to quit. E-cigarettes or smokeless tobacco still contain nicotine. Talk to your healthcare provider before you use these products.    Ask about the flu vaccine. The flu can make your asthma worse. You may need a yearly flu shot.  Follow up with your healthcare provider as directed: You will need to return to make sure your medicine is working and your symptoms are controlled. You may be referred to an asthma or allergy specialist. You may be asked to keep a record of your peak flow values and bring it with you to your appointments. Write down your questions so you remember to ask them during your visits.

## 2021-11-02 NOTE — ED POST DISCHARGE NOTE - REASON FOR FOLLOW-UP
Other Telephone follow up  request : Call patient with RVP results. RVP positive for Parainfluenza message left with Call Back  P.A. number and hours for return call back.

## 2021-12-30 ENCOUNTER — EMERGENCY (EMERGENCY)
Facility: HOSPITAL | Age: 21
LOS: 1 days | Discharge: HOME | End: 2021-12-30
Attending: EMERGENCY MEDICINE
Payer: MEDICAID

## 2021-12-30 VITALS
DIASTOLIC BLOOD PRESSURE: 74 MMHG | SYSTOLIC BLOOD PRESSURE: 106 MMHG | HEIGHT: 60 IN | OXYGEN SATURATION: 100 % | HEART RATE: 76 BPM | RESPIRATION RATE: 19 BRPM | WEIGHT: 145.06 LBS | TEMPERATURE: 98 F

## 2021-12-30 VITALS
HEART RATE: 72 BPM | TEMPERATURE: 98 F | OXYGEN SATURATION: 99 % | DIASTOLIC BLOOD PRESSURE: 80 MMHG | RESPIRATION RATE: 20 BRPM | SYSTOLIC BLOOD PRESSURE: 122 MMHG

## 2021-12-30 LAB — SARS-COV-2 RNA SPEC QL NAA+PROBE: DETECTED

## 2021-12-30 PROCEDURE — 99291 CRITICAL CARE FIRST HOUR: CPT | Mod: 25

## 2021-12-30 PROCEDURE — 93005 ELECTROCARDIOGRAM TRACING: CPT

## 2021-12-30 PROCEDURE — 96372 THER/PROPH/DIAG INJ SC/IM: CPT | Mod: XU

## 2021-12-30 PROCEDURE — U0003: CPT

## 2021-12-30 PROCEDURE — 96375 TX/PRO/DX INJ NEW DRUG ADDON: CPT

## 2021-12-30 PROCEDURE — 93010 ELECTROCARDIOGRAM REPORT: CPT

## 2021-12-30 PROCEDURE — 96374 THER/PROPH/DIAG INJ IV PUSH: CPT

## 2021-12-30 PROCEDURE — U0005: CPT

## 2021-12-30 PROCEDURE — 99291 CRITICAL CARE FIRST HOUR: CPT

## 2021-12-30 RX ORDER — ONDANSETRON 8 MG/1
4 TABLET, FILM COATED ORAL ONCE
Refills: 0 | Status: COMPLETED | OUTPATIENT
Start: 2021-12-30 | End: 2021-12-30

## 2021-12-30 RX ORDER — ONDANSETRON 8 MG/1
4 TABLET, FILM COATED ORAL ONCE
Refills: 0 | Status: DISCONTINUED | OUTPATIENT
Start: 2021-12-30 | End: 2021-12-30

## 2021-12-30 RX ORDER — DIPHENHYDRAMINE HCL 50 MG
25 CAPSULE ORAL ONCE
Refills: 0 | Status: COMPLETED | OUTPATIENT
Start: 2021-12-30 | End: 2021-12-30

## 2021-12-30 RX ORDER — EPINEPHRINE 0.3 MG/.3ML
0.3 INJECTION INTRAMUSCULAR; SUBCUTANEOUS
Qty: 1 | Refills: 0
Start: 2021-12-30

## 2021-12-30 RX ORDER — SODIUM CHLORIDE 9 MG/ML
1000 INJECTION INTRAMUSCULAR; INTRAVENOUS; SUBCUTANEOUS ONCE
Refills: 0 | Status: COMPLETED | OUTPATIENT
Start: 2021-12-30 | End: 2021-12-30

## 2021-12-30 RX ORDER — EPINEPHRINE 0.3 MG/.3ML
0.3 INJECTION INTRAMUSCULAR; SUBCUTANEOUS ONCE
Refills: 0 | Status: COMPLETED | OUTPATIENT
Start: 2021-12-30 | End: 2021-12-30

## 2021-12-30 RX ORDER — FAMOTIDINE 10 MG/ML
20 INJECTION INTRAVENOUS ONCE
Refills: 0 | Status: COMPLETED | OUTPATIENT
Start: 2021-12-30 | End: 2021-12-30

## 2021-12-30 RX ORDER — FAMOTIDINE 10 MG/ML
20 INJECTION INTRAVENOUS ONCE
Refills: 0 | Status: DISCONTINUED | OUTPATIENT
Start: 2021-12-30 | End: 2021-12-30

## 2021-12-30 RX ORDER — FAMOTIDINE 10 MG/ML
1 INJECTION INTRAVENOUS
Qty: 14 | Refills: 0
Start: 2021-12-30 | End: 2022-01-05

## 2021-12-30 RX ADMIN — ONDANSETRON 4 MILLIGRAM(S): 8 TABLET, FILM COATED ORAL at 10:29

## 2021-12-30 RX ADMIN — SODIUM CHLORIDE 1000 MILLILITER(S): 9 INJECTION INTRAMUSCULAR; INTRAVENOUS; SUBCUTANEOUS at 10:36

## 2021-12-30 RX ADMIN — EPINEPHRINE 0.3 MILLIGRAM(S): 0.3 INJECTION INTRAMUSCULAR; SUBCUTANEOUS at 10:22

## 2021-12-30 RX ADMIN — ONDANSETRON 4 MILLIGRAM(S): 8 TABLET, FILM COATED ORAL at 10:38

## 2021-12-30 RX ADMIN — FAMOTIDINE 20 MILLIGRAM(S): 10 INJECTION INTRAVENOUS at 10:36

## 2021-12-30 RX ADMIN — Medication 25 MILLIGRAM(S): at 10:35

## 2021-12-30 RX ADMIN — Medication 60 MILLIGRAM(S): at 10:29

## 2021-12-30 NOTE — ED PROVIDER NOTE - OBJECTIVE STATEMENT
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 Patient Was eating mozzarella sticks ordered out and found a breaded shrimp. 5 hours ago - face started swelling and multiple episodes of nausea and vomiting (approx 5 times). Pt also took Excedrin for a head shortly before. No sob, cp, wheezing. Has an epi pen at home but did not use it - did take po bendryl (2 tabs, unsure of dose). Symptoms starting to resolved but not completely.

## 2021-12-30 NOTE — ED PROVIDER NOTE - NSFOLLOWUPCLINICS_GEN_ALL_ED_FT
[facility]  Allergy  [address] [place] [address]  Phone: [phone]  Fax:

## 2021-12-30 NOTE — ED PROVIDER NOTE - NSFOLLOWUPINSTRUCTIONS_ED_ALL_ED_FT
Follow up with allergy and immunology for an evaluation for your allergy.    Always carry two epipens with you at all times, if you use one, you may have to use another in the next minutes to hours as there can be a rebound of swelling and symptoms.    There were no signs of an emergency medical condition on completion of today's workup.  You will need further medical care and evaluation. A presumptive diagnosis has been made, however further evaluation may be required by your primary care doctor or specialist for a definitive diagnosis.      Follow up with your medical doctor in 2-3 days or call our clinic at [phone] and state you were seen in the Emergency Department and would like to be seen in clinic. You may also call [phone] to speak with a representative to assist follow up care with medicine, surgery, or specialists.    If you are having pain, take Tylenol/acetaminophen 1 g every six hours and supplement (if allowed by your physician, and if you're not having gastric/gastrointestinal/stomach/intestinal problems) with ibuprofen 600 mg, with food or milk/maalox, every six hours which can be taken three hours apart from the Tylenol to have a layered effect.     Be sure to take no more than 4000mg or 4g of Tylenol/acetaminophen in a 24 hour period. Be sure to check your other medications to see if they include Tylenol/acetaminophen and include them in your calculations to ensure you do not take more than 4000mg or 4g of Tylenol/acetaminophen a day.    Drink at least 2 Liters or 64 Ounces of water each day (UNLESS you are supposed to restrict fluids or have a history of congestive heart failure (CHF)).    Return for any persistent, worsening symptoms, or ANY concerns at all.

## 2021-12-30 NOTE — ED CDU PROVIDER INITIAL DAY NOTE - OBJECTIVE STATEMENT
21F w pmhx of asthma, anaphylaxis to shrimp presenting with CC of allergic rxn. Patient Was eating mozzarella sticks ordered out and found a breaded shrimp. 5 hours prior to ED arrival. Reports her face started swelling and multiple episodes of nausea and vomiting (approx 5 times). Pt also took Excedrin for a headache shortly before. No sob, cp, wheezing. Has an epi pen at home but did not use it - did take po benadryl (2 tabs, unsure of dose). Symptoms starting to resolved but not completely.  In ED, VSS. Pt given epi x 1, solumedrol 60mg x 1, pepcid, and benadryl IV 25mg, and zofran with improvement of symptoms. Plan to monitor symptoms in CDU. Pt pending COVID results at time of CDU eval.

## 2021-12-30 NOTE — ED CDU PROVIDER INITIAL DAY NOTE - NS ED ROS FT
CONST: no fevers, no chills, no trauma  EYES: no pain, no blurry vision   ENT: + facial swelling; no sore throat, no epistaxis, no rhinorrhea  CV: no chest pain, no palpitations, no orthopnea, no extremity pain or swelling  RESP: no shortness of breath, no cough, no sputum, no pleurisy, no wheezing  ABD: no abdominal pain, + nausea, + vomiting, no diarrhea, no black or bloody stool  : no dysuria, no hematuria, no frequency, no urgency  MSK: no back pain, no neck pain, no extremity pain  NEURO: no headache, no sensory disturbances, no focal weakness, no dizziness  HEME: no easy bleeding or bruising  SKIN: no diaphoresis, no rash

## 2021-12-30 NOTE — ED PROVIDER NOTE - NS ED ROS FT
CONST: no fevers, no chills, no trauma  EYES: no pain, no blurry vision   ENT: no sore throat, no epistaxis, no rhinorrhea  CV: no chest pain, no palpitations, no orthopnea, no extremity pain or swelling  RESP: no shortness of breath, no cough, no sputum, no pleurisy, no wheezing  ABD: no abdominal pain, + nausea, + vomiting, no diarrhea, no black or bloody stool  : no dysuria, no hematuria, no frequency, no urgency  MSK: no back pain, no neck pain, no extremity pain  NEURO: no headache, no sensory disturbances, no focal weakness, no dizziness  HEME: no easy bleeding or bruising  SKIN: no diaphoresis, no rash

## 2021-12-30 NOTE — ED PROVIDER NOTE - CLINICAL SUMMARY MEDICAL DECISION MAKING FREE TEXT BOX
21F presenting w CC of allergic reaction. On exam, VSS w no wheezing or oropharyngeal edema. Given facial swelling + GI symptoms will treat as anaphylaxis - likely to shrimp vs meds. Meds, reassess. Mona Carrero, EM PGY3

## 2021-12-30 NOTE — ED PROVIDER NOTE - ATTENDING CONTRIBUTION TO CARE
Shahzad Currie MD, FACEP: In this physician's medical judgement based on clinical history and physical exam: Patient with possible exposure to allergen of shrimp for which she has epi pens for at home. Patient had facial swelling, nausea/vomiting last night and then nausea/vomiting this am as well.  Patient was asymptomatic in the emergency department initially.  moderate distress secondary to symptoms  hunched over dry heaving  no gross deformity of extremities, no asymmetry  due to patient now vomiting on Shahzad Currie MD, FACEP exam, will get iv, steroids iv, benadryl, epi im, ekg and observe for 4-6 hours.   Patient endorsed to the observation PA at the time of observation order placement.  Based on patient's history and physical exam, as well as the results of today's workup, I feel that patient warrants observation in the hospital for further workup/evaluation and continued management. I discussed the findings of today's workup with the patient and addressed the patient's questions and concerns. The patient was agreeable with observation. Our team spoke with the CDU receiving team who accepted the patient for observation and subsequently took over the patient's care at the time of order placement for observation. Shahzad Currie MD, FACEP: In this physician's medical judgement based on clinical history and physical exam: Patient with possible exposure to allergen of shrimp for which she has epi pens for at home. Patient had facial swelling, nausea/vomiting last night and then nausea/vomiting this am as well.  Patient was asymptomatic in the emergency department initially.  moderate distress secondary to symptoms  hunched over dry heaving  no gross deformity of extremities, no asymmetry  due to patient now vomiting on Shahzad Currie MD, FACEP exam, will get iv, steroids iv, benadryl, epi im, ekg and observe for 4-6 hours.   Will follow up on labs, analgesia, imaging, reassess and disposition as clinically indicated.  *The above represents an initial assessment/impression. Please refer to my progress notes above/below for potential changes in patient clinical course*   1512: patient improved, observed over time in emergency department without going to observation and will cancel observation order.

## 2021-12-30 NOTE — ED ADULT TRIAGE NOTE - MODE OF ARRIVAL
PHYSICAL THERAPY - DAILY TREATMENT NOTE    Patient Name: Ildefonso See        Date: 7/10/2018  : 1959   yes Patient  Verified  Visit #:     Insurance: Payor: Natasha Ohara / Plan: [place] [employer] P[place] / Product Type: PPO /      In time: 4:35P Out time: 5:35P   Total Treatment Time: 61     Medicare/ [employer] Time Tracking (below)   Total Timed Codes (min):  NA 1:1 Treatment Time:  NA     TREATMENT AREA =  Right foot pain [M79.671]    SUBJECTIVE  Pain Level (on 0 to 10 scale):  4  / 10   Medication Changes/New allergies or changes in medical history, any new surgeries or procedures?    no  If yes, update Summary List   Subjective Functional Status/Changes:  []  No changes reported     See POC          OBJECTIVE  Modalities Rationale:   PD   min [] Estim, type/location:                                      []  att     []  unatt     []  w/US     []  w/ice    []  w/heat    min []  Mechanical Traction: type/lbs                   []  pro   []  sup   []  int   []  cont    []  before manual    []  after manual    min []  Ultrasound, settings/location:      min []  Iontophoresis w/ dexamethasone, location:                                               []  take home patch       []  in clinic    min []  Ice     []  Heat    location/position:     min []  Vasopneumatic Device, press/temp:     min []  Other:    [] Skin assessment post-treatment (if applicable):    []  intact    []  redness- no adverse reaction     []redness - adverse reaction:        10 min Therapeutic Exercise:  [x]  See flow sheet   Rationale:      increase ROM, increase strength, improve coordination, improve balance and increase proprioception to improve the patients ability to perform unlimited ADLs        min Patient Education:  yes  Reviewed HEP   []  Progressed/Changed HEP based on:        Other Objective/Functional Measures:    See POC     Post Treatment Pain Level (on 0 to 10) scale:   4  / 10     ASSESSMENT  Assessment/Changes in Function: Walk in See POC     []  See Progress Note/Recertification   Patient will continue to benefit from skilled PT services to modify and progress therapeutic interventions, address functional mobility deficits, address ROM deficits, address strength deficits, analyze and address soft tissue restrictions, analyze and cue movement patterns, analyze and modify body mechanics/ergonomics, assess and modify postural abnormalities, address imbalance/dizziness and instruct in home and community integration to attain remaining goals.    Progress toward goals / Updated goals:    See POC     PLAN  [x]  Upgrade activities as tolerated yes Continue plan of care   []  Discharge due to :    []  Other:      Therapist: Shana Whelan PT, DPT    Date: 7/10/2018 Time: 6:46 PM     Future Appointments  Date Time Provider Dunia Allen   7/12/2018 6:00 PM [facility]   7/17/2018 3:00 PM [facility]   7/19/2018 10:30 AM Mauro Schroeder, ANDREA [facility]   7/24/2018 10:30 AM [facility]   7/26/2018 10:30 AM Dory Gutierrez, PT [facility]

## 2021-12-30 NOTE — ED ADULT TRIAGE NOTE - CHIEF COMPLAINT QUOTE
allergic rxn- allergic to shrimp, states there was shrimp in her food last night  b/l eye swelling, nausea, vomiting   denies sob/throat swelling

## 2021-12-30 NOTE — ED PROVIDER NOTE - PROGRESS NOTE DETAILS
Pt now dry heaving, will DC po meds and treat w anaphylaxis meds. Mona Carrero, COMFORT PGY3 Shahzad Currie MD, FACEP patient improved, no stridor, no wheezing, no rash, no swelling.  The patient was serially evaluated throughout emergency department course. There was no acute deterioration up to this time in the department. Patient has demonstrated clinical improvement and is stable, feels better at this time according to emergency department team. Agree with goals/plan of emergency department care as described in this physician's electronic medical record, including diagnostics, therapeutics and consultation as clinically warranted. Will discharge home with close outpatient follow up with primary care physician/provider and specialist if necessary. The patient and/or family was educated on concerning signs and features to return to the emergency department, in layman terms, including but not limited to: nausea, vomiting, fever, chills, persistent/worsening symptoms or any concerns at all. No immediate life threatening issues present on history, clinical exam, or any diagnostic evaluation. The patient is a safe disposition home, has capacity and insight into their condition, is ambulatory in the Emergency Department with no further questions and will follow up with their doctor(s) this week. Diagnosis, prognosis, natural history and treatment was discussed with patient and/or family. The patient and/or family were given the opportunity to ask questions and have them answered in full. The patient and/or family are with capacity and insight into the situation, treatment, risks, benefits, alternative therapies, and understand that they can ask any further questions if needed. Patient and/or family/guardian understands anticipatory guidance and was given strict return and follow up precautions. The patient and/or family/guardian has been informed, in layman terms, of all concerning signs and symptoms to return to Emergency Department, the necessity to follow up with the PMD/Clinic/follow up provided within 2-3 days was explained, and the patient and/or family/guardian reports understanding of above with capacity and insight. The patient and/or family/guardian were informed of any results of their tests and are were encouraged to follow up on the findings with their doctor as well as the need to inform their doctor of any results. The patient and/or family/guardian are aware of the need to follow up with repeat testing as applicable and report understanding of the above with capacity and insight. The patient and/or family/guardian was made aware of any pending test results at the time of discharge and of the need to call back for the final results a well as the need to inform their doctor of the results.

## 2021-12-30 NOTE — ED CDU PROVIDER INITIAL DAY NOTE - ATTENDING CONTRIBUTION TO CARE
***Shahzad Currie MD***  I have personally performed a face to face diagnostic evaluation on this patient.  I have reviewed the ACP note and agree with the history, exam, and plan of care, except as noted. If needed, patient will be reassessed and discussed with AM/PM CDU/FT MD who will follow up on labs, analgesia, imaging and disposition as clinically indicated. Please see emergency department provider note.   Patient  understands anticipatory guidance and was given strict return and follow up precautions. The patient has been informed of all concerning signs and symptoms to return to Emergency Department, the necessity to follow up with PMD/Clinic/follow up provided within 2-3 days was explained, and the patient reports understanding of above with capacity and insight if patient disposition is to be home.

## 2021-12-30 NOTE — ED PROVIDER NOTE - PHYSICAL EXAMINATION
Const: Well-nourished, Well-developed, appearing stated age.  Eyes: no conjunctival injection, and symmetrical lids.  HEENT: Head NCAT, no lesions. Atraumatic external nose and ears. +b/l facial swelling No oropharyngeal edema.   Neck: Symmetric, trachea midline.   CVS: +S1/S2, Radial and DP pulses 2+ b/l.   RESP: Unlabored respiratory effort. Clear to auscultation bilaterally, no wheezes.   GI: Nontender/Nondistended, No CVA tenderness b/l.   MSK: Normocephalic/Atraumatic, Lower Extremities w/o edema b/l.   Skin: Warm, dry and intact.   Neuro: Fluent Speech. Motor & Sensation grossly intact.  Psych: Awake, Alert, & Oriented (AAO) x3. Appropriate mood and affect.

## 2021-12-30 NOTE — ED PROVIDER NOTE - PATIENT PORTAL LINK FT
You can access the FollowMyHealth Patient Portal offered by [facility] by registering at the following website: [email or web address]. By joining [facility]’s FollowMyHealth portal, you will also be able to view your health information using other applications (apps) compatible with our system.

## 2021-12-30 NOTE — ED CDU PROVIDER INITIAL DAY NOTE - DETAILS
Allergic reaction; Anaphylaxis  - s/p epi x 1  - steroids, benadryl, pepcid  - freq re-eval  Case d/w Dr. Crurie

## 2021-12-30 NOTE — ED ADULT NURSE NOTE - OBJECTIVE STATEMENT
patient came in c/o allergic reaction to Excedrin which she took 5 hours ago. Pt with hx: anaphylaxis to shrimp. Pt states that her face was swollen and she started to vomit 5 hours ago. While in the ER, pt also started to vomit. No tongue swelling noted. No difficulty breathing. Pt is able to speak in complete sentences. No drooling. Emotional support offered.

## 2021-12-30 NOTE — ED CDU PROVIDER INITIAL DAY NOTE - PHYSICAL EXAMINATION
Const: Well-nourished, Well-developed, appearing stated age.  Eyes: no conjunctival injection, and symmetrical lids.  HEENT: Head NCAT, no lesions. Atraumatic external nose and ears. + mild b/l facial swelling mostly periorbital (improving from onset per patient) No oropharyngeal edema.   Neck: Symmetric, trachea midline.   CVS: +S1/S2, Radial and DP pulses 2+ b/l.   RESP: Unlabored respiratory effort. Clear to auscultation bilaterally, no wheezes.   GI: Nontender/Nondistended.    MSK: Normocephalic/Atraumatic, Lower Extremities w/o edema b/l.   Skin: Warm, dry and intact.   Neuro: Fluent Speech. Motor & Sensation grossly intact.  Psych: Awake, Alert, & Oriented (AAO) x3. Appropriate mood and affect.
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
## 2022-04-11 NOTE — ED ADULT TRIAGE NOTE - MEANS OF ARRIVAL
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  Symptoms are moderate intermittent aching waking her up at night worse with activity somewhat better with rest past medical history is remarkable for anxiety reflux mononucleosis pleurisy        Allergies:   Allergies   Allergen Reactions   • Amoxicillin Itching and Swelling   • Hydrocodone Itching   • Morphine Itching   • Penicillins Rash       Medications:   Home Medications:  Current Outpatient Medications on File Prior to Visit   Medication Sig   • atorvastatin (LIPITOR) 10 MG tablet Take 1 tablet by mouth Daily.   • cetirizine (zyrTEC) 10 MG tablet Take 10 mg by mouth Daily.   • Cholecalciferol (VITAMIN D PO) Take  by mouth.   • DULoxetine (CYMBALTA) 30 MG capsule TAKE 1 CAPSULE DAILY   • esomeprazole (nexIUM) 20 MG capsule Take 20 mg by mouth Every Morning Before Breakfast.   • FOLIC ACID PO Take 1 tablet by mouth Daily.   • MAGNESIUM CITRATE PO Take 1 tablet by mouth Daily.   • medroxyPROGESTERone (PROVERA) 10 MG tablet TAKE 1 TABLET BY MOUTH DAILY FOR 10 DAYS AS NEEDED FOR MISSED MJENSES.   • mometasone (NASONEX) 50 MCG/ACT nasal spray 2 sprays into the nostril(s) as directed by provider Daily.   • Multiple Vitamins-Minerals (MULTIVITAMIN WOMEN PO) Take 1 tablet by mouth Daily.   • spironolactone (ALDACTONE) 50 MG tablet Take 50 mg by mouth daily.     Current Facility-Administered Medications on File Prior to Visit   Medication   • cyanocobalamin injection 1,000 mcg     Current Medications:  Scheduled Meds:cyanocobalamin, 1,000 mcg, Intramuscular, Q28 Days      Continuous Infusions:   PRN Meds:.    Past Medical History:    Diagnosis Date   • Anxiety    • Cystic fibrosis gene carrier    • Cystocele with rectocele    • Depression    • Diverticulitis    • Diverticulosis of colon    • GERD (gastroesophageal reflux disease)    • History of endometrial biopsy    • Incontinence    • Lymph node symptom    • Miscarriage    • Mononucleosis    • Pleurisy    • Vaginal delivery     x2        Past Surgical History:   Procedure Laterality Date   • BREAST BIOPSY     • CHOLECYSTECTOMY N/A 2003   • COLONOSCOPY W/ BIOPSIES  2013    DIVERTICULITIS         DR. RIOS   • DILATATION AND CURETTAGE      miscarriage    • ENDOMETRIAL BIOPSY     • OVERSEW OF LYMPHATIC FISTULA     • UPPER GASTROINTESTINAL ENDOSCOPY  01/2016    Trace Cardoza MD        Social History     Occupational History   • Occupation:    Tobacco Use   • Smoking status: Never Smoker   • Smokeless tobacco: Never Used   Vaping Use   • Vaping Use: Never used   Substance and Sexual Activity   • Alcohol use: Yes     Comment: SOCIAL    • Drug use: No   • Sexual activity: Yes     Partners: Male     Birth control/protection: Surgical     Comment: vasectomy    SPOUSE = JOAQUIM      Social History     Social History Narrative   • Not on file        Family History   Problem Relation Age of Onset   • Anxiety disorder Father    • Diverticulitis Father    • Nephrolithiasis Father    • Anxiety disorder Sister    • Breast cancer Other    • No Known Problems Mother    • No Known Problems Maternal Grandmother    • Lung cancer Maternal Grandfather    • No Known Problems Paternal Grandmother    • Lung cancer Paternal Grandfather    • No Known Problems Daughter    • No Known Problems Son    • No Known Problems Maternal Aunt    • No Known Problems Paternal Aunt    • BRCA 1/2 Neg Hx    • Colon cancer Neg Hx    • Endometrial cancer Neg Hx    • Ovarian cancer Neg Hx    • Colon polyps Neg Hx              Review of Systems: 14 point review of systems more for the elbow pain only the remainder negative per  the patient  Review of Systems      Physical Exam: 45 y.o. female  General Appearance:    Alert, cooperative, in no acute distress                 There were no vitals filed for this visit.   Patient is alert and read ×3 no acute distress appears her above-listed at height weight and age.  Affect is normal respiratory rate is normal unlabored. Heart rate regular rate rhythm, sclera, dentition and hearing are normal for the purpose of this exam.        Ortho Exam    Physical exam of the right elbow reveals no effusion no redness.  The patient has full range of motion.  They have tenderness over the lateral condyle.  There pain with resisted wrist extension no pain with passive wrist flexion.  They have a negative Tinel's.  Have no overlying skin changes no lymphedema no lymphadenopathy.  Good distal pulses       Radiology:   AP, Lateral of the right elbow were ordered/reviewed to evaluate pain.  I have no comparative films these are normal I see no evidence of any acute bony pathology      Assessment/Plan: Right elbow pain I think this is straightforward lateral condyle-itis had a long discussion with her regarding options including steroid injection platelet injection stretching strengthening we will going to inject her today per her request she understands the risk associated with that.  We will put her in a cock-up splint I will start meloxicam with strict precaution and give her a sample of Pennsaid.  If she fails to improve with that would consider PRP also other means of testing      Medium Joint Arthrocentesis: R elbow  Date/Time: 4/11/2022 3:34 PM  Consent given by: patient  Site marked: site marked  Timeout: Immediately prior to procedure a time out was called to verify the correct patient, procedure, equipment, support staff and site/side marked as required   Supporting Documentation  Indications: pain and diagnostic evaluation   Procedure Details  Location: elbow - R elbow (Lateral  Epicondyle)  Preparation: Patient was prepped and draped in the usual sterile fashion  Needle size: 25 G  Approach: Into the area of the common flexor tendon.  Medications administered: 80 mg methylPREDNISolone acetate 80 MG/ML; 3 mL lidocaine (cardiac)                                         ambulatory

## 2022-07-03 ENCOUNTER — EMERGENCY (EMERGENCY)
Facility: HOSPITAL | Age: 22
LOS: 1 days | Discharge: HOME | End: 2022-07-03
Attending: EMERGENCY MEDICINE | Admitting: EMERGENCY MEDICINE

## 2022-07-03 VITALS
TEMPERATURE: 97 F | RESPIRATION RATE: 16 BRPM | OXYGEN SATURATION: 100 % | SYSTOLIC BLOOD PRESSURE: 110 MMHG | HEIGHT: 60 IN | DIASTOLIC BLOOD PRESSURE: 75 MMHG | HEART RATE: 57 BPM

## 2022-07-03 VITALS
OXYGEN SATURATION: 100 % | DIASTOLIC BLOOD PRESSURE: 69 MMHG | HEART RATE: 67 BPM | TEMPERATURE: 98 F | RESPIRATION RATE: 16 BRPM | SYSTOLIC BLOOD PRESSURE: 104 MMHG

## 2022-07-03 LAB
ALBUMIN SERPL ELPH-MCNC: 4.1 G/DL — SIGNIFICANT CHANGE UP (ref 3.3–5)
ALP SERPL-CCNC: 75 U/L — SIGNIFICANT CHANGE UP (ref 40–120)
ALT FLD-CCNC: 6 U/L — SIGNIFICANT CHANGE UP (ref 4–33)
ANION GAP SERPL CALC-SCNC: 10 MMOL/L — SIGNIFICANT CHANGE UP (ref 7–14)
AST SERPL-CCNC: 17 U/L — SIGNIFICANT CHANGE UP (ref 4–32)
BILIRUB SERPL-MCNC: 0.3 MG/DL — SIGNIFICANT CHANGE UP (ref 0.2–1.2)
BUN SERPL-MCNC: 16 MG/DL — SIGNIFICANT CHANGE UP (ref 7–23)
CALCIUM SERPL-MCNC: 9.5 MG/DL — SIGNIFICANT CHANGE UP (ref 8.4–10.5)
CHLORIDE SERPL-SCNC: 100 MMOL/L — SIGNIFICANT CHANGE UP (ref 98–107)
CO2 SERPL-SCNC: 23 MMOL/L — SIGNIFICANT CHANGE UP (ref 22–31)
CREAT SERPL-MCNC: 0.85 MG/DL — SIGNIFICANT CHANGE UP (ref 0.5–1.3)
EGFR: 99 ML/MIN/1.73M2 — SIGNIFICANT CHANGE UP
GLUCOSE SERPL-MCNC: 85 MG/DL — SIGNIFICANT CHANGE UP (ref 70–99)
HCT VFR BLD CALC: 35.9 % — SIGNIFICANT CHANGE UP (ref 34.5–45)
HGB BLD-MCNC: 11.6 G/DL — SIGNIFICANT CHANGE UP (ref 11.5–15.5)
MAGNESIUM SERPL-MCNC: 2 MG/DL — SIGNIFICANT CHANGE UP (ref 1.6–2.6)
MCHC RBC-ENTMCNC: 28.6 PG — SIGNIFICANT CHANGE UP (ref 27–34)
MCHC RBC-ENTMCNC: 32.3 GM/DL — SIGNIFICANT CHANGE UP (ref 32–36)
MCV RBC AUTO: 88.4 FL — SIGNIFICANT CHANGE UP (ref 80–100)
NRBC # BLD: 0 /100 WBCS — SIGNIFICANT CHANGE UP
NRBC # FLD: 0 K/UL — SIGNIFICANT CHANGE UP
PHOSPHATE SERPL-MCNC: 4.2 MG/DL — SIGNIFICANT CHANGE UP (ref 2.5–4.5)
PLATELET # BLD AUTO: 279 K/UL — SIGNIFICANT CHANGE UP (ref 150–400)
POTASSIUM SERPL-MCNC: 3.9 MMOL/L — SIGNIFICANT CHANGE UP (ref 3.5–5.3)
POTASSIUM SERPL-SCNC: 3.9 MMOL/L — SIGNIFICANT CHANGE UP (ref 3.5–5.3)
PROT SERPL-MCNC: 7.3 G/DL — SIGNIFICANT CHANGE UP (ref 6–8.3)
RBC # BLD: 4.06 M/UL — SIGNIFICANT CHANGE UP (ref 3.8–5.2)
RBC # FLD: 13.2 % — SIGNIFICANT CHANGE UP (ref 10.3–14.5)
SODIUM SERPL-SCNC: 133 MMOL/L — LOW (ref 135–145)
WBC # BLD: 10.56 K/UL — HIGH (ref 3.8–10.5)
WBC # FLD AUTO: 10.56 K/UL — HIGH (ref 3.8–10.5)

## 2022-07-03 PROCEDURE — 99284 EMERGENCY DEPT VISIT MOD MDM: CPT

## 2022-07-03 RX ORDER — DICLOFENAC SODIUM 75 MG/1
1 TABLET, DELAYED RELEASE ORAL
Qty: 30 | Refills: 0
Start: 2022-07-03 | End: 2022-08-01

## 2022-07-03 RX ORDER — OXYCODONE HYDROCHLORIDE 5 MG/1
1 TABLET ORAL
Qty: 8 | Refills: 0
Start: 2022-07-03 | End: 2022-07-04

## 2022-07-03 RX ORDER — OXYCODONE HYDROCHLORIDE 5 MG/1
5 TABLET ORAL ONCE
Refills: 0 | Status: DISCONTINUED | OUTPATIENT
Start: 2022-07-03 | End: 2022-07-03

## 2022-07-03 RX ADMIN — OXYCODONE HYDROCHLORIDE 5 MILLIGRAM(S): 5 TABLET ORAL at 08:49

## 2022-07-03 NOTE — ED PROVIDER NOTE - PHYSICAL EXAMINATION
GENERAL APPEARANCE: Well developed, NAD  HEENT:  EOMI. hearing grossly intact. Tenderness over left jaw  NECK: Neck supple, non-tender no lymphadenopathy, masses or thyromegaly.  CARDIAC: Normal S1 and S2. no mrg. RRR  LUNGS: Clear to auscultation B/L, no rales, rhonchi, or wheezing  ABDOMEN: Soft , NTND, bowel sounds normal. No guarding or rebound.   EXTREMITIES: No edema. Peripheral pulses intact.   NEUROLOGICAL: Non focal. Strength and sensation symmetric and intact throughout.   SKIN: Warm and dry , Well perfused  PSYCHIATRIC: AOx3 , Normal mood and affect

## 2022-07-03 NOTE — ED ADULT NURSE NOTE - OBJECTIVE STATEMENT
21 yof presents A&Ox4, c/o R & L facial pain and swelling x1 day. States she was supposed to get a root canal and wisdom tooth removed however was unable to do so, now thinks wisdom tooth is infected. Denies any fever or chills. Respirations even and unlabored, sating 100% on RA. Pt denies any chest pain, dyspnea, dizziness, N/V/D, recent fevers or trauma. Minor swelling present on R & L side of face. pt well appearing, NAD noted. Pending MD bal. bed in lowest position, side rails up, call bell in hand, safety maintained.

## 2022-07-03 NOTE — ED ADULT TRIAGE NOTE - CHIEF COMPLAINT QUOTE
Pt arrives to reporting she needs wisdom teeth surgery but reports she thinks they are infected with left and right facial swelling and pain.

## 2022-07-03 NOTE — ED PROVIDER NOTE - NSFOLLOWUPINSTRUCTIONS_ED_ALL_ED_FT
You were prescribed diclofenac gel and oxycodone for your pain. You can take 1 pill of oxycodone every 6 hours as needed. Please schedule an appointment with your dentist for removal of your wisdom teeth. If you fever fevers, chills, shortness of breath please return to the hospital.

## 2022-07-03 NOTE — ED PROVIDER NOTE - PATIENT PORTAL LINK FT
You can access the FollowMyHealth Patient Portal offered by [facility] by registering at the following website: [email or web address]. By joining [employer]’s FollowMyHealth portal, you will also be able to view your health information using other applications (apps) compatible with our system.

## 2022-07-03 NOTE — ED PROVIDER NOTE - OBJECTIVE STATEMENT
21F w/ PMHx of asthma and allergy to pain medications such as ibuprofen/tylenol is presenting for dental pain. Per patient she was supposed to have her wisdom teeth and root canal removed 3 weeks ago but missed her appointment due to COVID. She is coming in today due to intolerable pain and inability to take pain meds at home. She denies systemic systems: no fevers, chills, SOB, muscle pain, N/V/C/D, chest pain, palpitations.

## 2022-07-03 NOTE — ED PROVIDER NOTE - ATTENDING CONTRIBUTION TO CARE
Seen and examined, states tooth pain and awaiting dental eval. had appt. but missed due to covid positive. Pt. has mult. allergy and is unable to take OTC meds. Has used ibuprofen with swelling as reaction, also tried naproxen (alleve) with milder reaction. No swelling to face or fever/chills. MMM, tender L lower mandible premolar, no gum swelling, no fluctuance, no facial asymmetry, no cervical LNs.

## 2022-07-03 NOTE — ED PROVIDER NOTE - CLINICAL SUMMARY MEDICAL DECISION MAKING FREE TEXT BOX
21F w/ PMHx of asthma and allergy to pain medications such as ibuprofen/tylenol is presenting for dental pain due to missed wisdom teeth removal appointment. 21F w/ PMHx of asthma and allergy to pain medications such as ibuprofen/tylenol is presenting for dental pain due to missed wisdom teeth removal appointment. Patient given oxycodone for pain relief 21F w/ PMHx of asthma and allergy to pain medications such as ibuprofen/tylenol is presenting for dental pain due to missed wisdom teeth removal appointment. Patient given oxycodone for pain relief with improvement and amenable to attempting other NSAID.

## 2022-07-04 NOTE — CHART NOTE - NSCHARTNOTEFT_GEN_A_CORE
Natanael PGY-3: Received call from pharmacist as SUZI who requested confirmation that patient is OK to be dispensed Diclofenac, has known allergy to NSAID. Spoke with Dr. Contreras, resident physician who encountered patient yesterday for ED visit, who confirms that patient had discussion with Dr. Mckinnon regarding her medication allergies and is OK to take Diclofenac. Instructed pharmacy to give patient strict return precautions for any reaction to taking drug.

## 2022-07-08 ENCOUNTER — EMERGENCY (EMERGENCY)
Facility: HOSPITAL | Age: 22
LOS: 1 days | Discharge: HOME | End: 2022-07-08
Attending: EMERGENCY MEDICINE
Payer: MEDICAID

## 2022-07-08 VITALS
RESPIRATION RATE: 18 BRPM | SYSTOLIC BLOOD PRESSURE: 120 MMHG | HEART RATE: 89 BPM | OXYGEN SATURATION: 97 % | TEMPERATURE: 98 F | WEIGHT: 149.91 LBS | HEIGHT: 60 IN | DIASTOLIC BLOOD PRESSURE: 78 MMHG

## 2022-07-08 VITALS
TEMPERATURE: 98 F | RESPIRATION RATE: 16 BRPM | HEART RATE: 84 BPM | SYSTOLIC BLOOD PRESSURE: 109 MMHG | OXYGEN SATURATION: 99 % | DIASTOLIC BLOOD PRESSURE: 65 MMHG

## 2022-07-08 PROCEDURE — 99283 EMERGENCY DEPT VISIT LOW MDM: CPT

## 2022-07-08 PROCEDURE — 99284 EMERGENCY DEPT VISIT MOD MDM: CPT

## 2022-07-08 RX ORDER — DIPHENHYDRAMINE HYDROCHLORIDE AND LIDOCAINE HYDROCHLORIDE AND ALUMINUM HYDROXIDE AND MAGNESIUM HYDRO
1 KIT
Qty: 1 | Refills: 0
Start: 2022-07-08 | End: 2022-07-17

## 2022-07-08 RX ORDER — OXYCODONE HYDROCHLORIDE 5 MG/1
5 TABLET ORAL ONCE
Refills: 0 | Status: DISCONTINUED | OUTPATIENT
Start: 2022-07-08 | End: 2022-07-08

## 2022-07-08 RX ORDER — DIPHENHYDRAMINE HYDROCHLORIDE AND LIDOCAINE HYDROCHLORIDE AND ALUMINUM HYDROXIDE AND MAGNESIUM HYDRO
10 KIT ONCE
Refills: 0 | Status: COMPLETED | OUTPATIENT
Start: 2022-07-08 | End: 2022-07-08

## 2022-07-08 RX ADMIN — OXYCODONE HYDROCHLORIDE 5 MILLIGRAM(S): 5 TABLET ORAL at 18:29

## 2022-07-08 RX ADMIN — DIPHENHYDRAMINE HYDROCHLORIDE AND LIDOCAINE HYDROCHLORIDE AND ALUMINUM HYDROXIDE AND MAGNESIUM HYDRO 10 MILLILITER(S): KIT at 19:29

## 2022-07-08 NOTE — ED PROVIDER NOTE - CLINICAL SUMMARY MEDICAL DECISION MAKING FREE TEXT BOX
Patient is 21 y.o female here for oral stiches falling out s/p extraction. Patient is in distress and in pain. She was in the dental clinic earlier today and was refereed here after her stiches were displaced. She had taken oxycodone for pain around 1pm after being prescribed by the dentist. She will be given an additional dosage of 5mg oxycodone and dental was consulted (will come see the patient).

## 2022-07-08 NOTE — CONSULT NOTE ADULT - SUBJECTIVE AND OBJECTIVE BOX
Patient is a 21y old  Female who presents with a chief complaint of bleeding and pain from extraction of her wisdom teeth.     MEDICATIONS  (STANDING):  FIRST- Mouthwash  BLM 10 milliLiter(s) Swish and Spit Once    MEDICATIONS  (PRN):      Allergies    ibuprofen (Hives)  shellfish (Unknown)  Tylenol (Angioedema)    Intolerances        FAMILY HISTORY:  No pertinent family history in first degree relatives        *SOCIAL HISTORY: (guardian or who pt came with), (smoking hx)    *Last Dental Visit:: 7/8/2022 at [facility] dental clinic     Vital Signs Last 24 Hrs  T(C): 36.7 (08 Jul 2022 16:53), Max: 36.7 (08 Jul 2022 16:53)  T(F): 98 (08 Jul 2022 16:53), Max: 98 (08 Jul 2022 16:53)  HR: 89 (08 Jul 2022 16:53) (89 - 89)  BP: 120/78 (08 Jul 2022 16:53) (120/78 - 120/78)  BP(mean): --  RR: 18 (08 Jul 2022 16:53) (18 - 18)  SpO2: 97% (08 Jul 2022 16:53) (97% - 97%)    Parameters below as of 08 Jul 2022 16:53  Patient On (Oxygen Delivery Method): room air    SUBJECTIVE FINDINGS:  Patient reports with discomfort, bleeding and falling out of sutures in her wisdom teeth extraction sites #1,16,17,32.    ASSESSMENT:  Bedside exam completed. Upon clinical examination, there were no significant swelling or infection. Patient was bleeding within normal limits of post extraction. Sutures still present at extraction sites. Discussed with patient about not necessarily needing sutures at extraction sites and whether she wants the dental residents on call to replace/re-do the sutures under local anesthesia. Patient decided to not replace the sutures. 4x4 gauze soaked with saline and placed into wisdom teeth sites on both sides and instructed patient to stay biting for the next 30minutes. Instructed patient to only to replace the gauze if bleeding still present, and to report to the provider and the dental residents will return to apply pressure with gauze. As for pain management, patient was given oxycodone and magic mouth wash. Patient expressed concerns about the appointment earlier in [facility] for her wisdom teeth removal. Patient reports that she "woke up" during the middle of the appointment and told the dentist that she was awake and feels the pain but dentist continued to proceed the procedure disregarding the patient's complaint. Patient also stated with showing dental residents a picture of her dental radiograph taken 1week ago on tooth #14 stating that the tooth was chipped/broke during the extraction appointment done at [facility] on 7/8/2022. Patient reports that residents told them the tooth broke slightly but needs to come out, while the attending stated that the tooth was not involved during the wisdom teeth removal.       PROCEDURE: Soaked 4x4 gauze with saline and placed at sites #17 and 32 and instructed patients to stay biting for the next 30minutes then remove the gauze. Patient was evaluated 1hour later. Patient achieved hemostasis.      RECOMMENDATIONS:   1) Dental F/U with outpatient dentist for comprehensive dental care.       Anderson Mckeon DDS #[id number]  Radha Quiros DDS #[id number]   Patient is a 21y old  Female who presents with a chief complaint of bleeding and pain from extraction of her wisdom teeth.     MEDICATIONS  (STANDING):  FIRST- Mouthwash  BLM 10 milliLiter(s) Swish and Spit Once    MEDICATIONS  (PRN):      Allergies    ibuprofen (Hives)  shellfish (Unknown)  Tylenol (Angioedema)    Intolerances        FAMILY HISTORY:  No pertinent family history in first degree relatives        *SOCIAL HISTORY: (guardian or who pt came with), (smoking hx)    *Last Dental Visit:: 7/8/2022 at [facility] dental clinic     Vital Signs Last 24 Hrs  T(C): 36.7 (08 Jul 2022 16:53), Max: 36.7 (08 Jul 2022 16:53)  T(F): 98 (08 Jul 2022 16:53), Max: 98 (08 Jul 2022 16:53)  HR: 89 (08 Jul 2022 16:53) (89 - 89)  BP: 120/78 (08 Jul 2022 16:53) (120/78 - 120/78)  BP(mean): --  RR: 18 (08 Jul 2022 16:53) (18 - 18)  SpO2: 97% (08 Jul 2022 16:53) (97% - 97%)    Parameters below as of 08 Jul 2022 16:53  Patient On (Oxygen Delivery Method): room air    SUBJECTIVE FINDINGS:  Patient reports with discomfort, bleeding and falling out of sutures in her wisdom teeth extraction sites #1,16,17,32.    ASSESSMENT:  Bedside exam completed. Upon clinical examination, there were no significant swelling or infection. Patient was bleeding within normal limits of post extraction. Sutures still present at extraction sites. Discussed with patient about not necessarily needing sutures at extraction sites and whether she wants the dental residents on call to replace/re-do the sutures under local anesthesia. Patient decided to not replace the sutures. 4x4 gauze soaked with saline and placed into wisdom teeth sites on both sides and instructed patient to stay biting for the next 30minutes. Instructed patient to only to replace the gauze if bleeding still present, and to report to the provider and the dental residents will return to apply pressure with gauze. As for pain management, patient was given oxycodone and magic mouth wash. Patient expressed concerns about the appointment earlier in [facility] for her wisdom teeth removal. Patient reports that she "woke up" during the middle of the appointment and told the dentist that she was awake and feels the pain but dentist continued to proceed the procedure disregarding the patient's complaint. Patient also stated with showing dental residents a picture of her dental radiogrph taken 1week ago on tooth #14 stating that the tooth was chipped/broke during the extraction appointment done at [facility] on 7/8/2022. Patient reports that residents told them the tooth broke slightly but needs to come out, while the attending stated that the tooth was not involved during the wisdom teeth removal.       PROCEDURE: Soaked 4x4 gauze with saline and placed at sites #17 and 32 and instructed patients to stay biting for the next 30minutes then remove the gauze. Patient was evaluated 1hour later. Patient achieved hemostasis.      RECOMMENDATIONS:   1) Dental F/U with outpatient dentist for comprehensive dental care.       Anderson Mckeon DDS #[id number]  Radha Quiros DDS #[id number]   Patient is a 21y old  Female who presents with a chief complaint of bleeding and pain from extraction of her wisdom teeth.     MEDICATIONS  (STANDING):  FIRST- Mouthwash  BLM 10 milliLiter(s) Swish and Spit Once    MEDICATIONS  (PRN):      Allergies    ibuprofen (Hives)  shellfish (Unknown)  Tylenol (Angioedema)    Intolerances        FAMILY HISTORY:  No pertinent family history in first degree relatives        *SOCIAL HISTORY: (guardian or who pt came with), (smoking hx)    *Last Dental Visit:: 7/8/2022 at [facility] dental clinic     Vital Signs Last 24 Hrs  T(C): 36.7 (08 Jul 2022 16:53), Max: 36.7 (08 Jul 2022 16:53)  T(F): 98 (08 Jul 2022 16:53), Max: 98 (08 Jul 2022 16:53)  HR: 89 (08 Jul 2022 16:53) (89 - 89)  BP: 120/78 (08 Jul 2022 16:53) (120/78 - 120/78)  BP(mean): --  RR: 18 (08 Jul 2022 16:53) (18 - 18)  SpO2: 97% (08 Jul 2022 16:53) (97% - 97%)    Parameters below as of 08 Jul 2022 16:53  Patient On (Oxygen Delivery Method): room air    SUBJECTIVE FINDINGS:  Patient reports with discomfort, bleeding and falling out of sutures in her wisdom teeth extraction sites #1,16,17,32.    ASSESSMENT:  Bedside exam completed. Upon clinical examination, there were no significant swelling or infection. Patient was bleeding within normal limits of post extraction. Sutures still present at extraction sites. Discussed with patient about not necessarily needing sutures at extraction sites and whether she wants the dental residents on call to replace/re-do the sutures under local anesthesia. Patient decided to not replace the sutures. 4x4 gauze soaked with saline and placed into wisdom teeth sites on both sides and instructed patient to stay biting for the next 30minutes. Instructed patient to only to replace the gauze if bleeding still present, and to report to the provider and the dental residents will return to apply pressure with gauze. As for pain management, patient was given oxycodone and magic mouth wash. Patient expressed concerns about the appointment earlier in [facility] for her wisdom teeth removal. Patient reports that she "Woke up" during the middle of the appointment and told the dentist that she was awake and feels the pain but dentist continued to proceed the procedure disregarding the patient's complaint. Patient also stated with showing dental residents a picture of her dental radiogrph taken 1week ago on tooth #14 stating that the tooth was chipped/broke during the extraction appointment done at [facility] on 7/8/2022. Patient reports that residents told them the tooth broke slightly but needs to come out, while the attending stated that the tooth was not involved during the wisdom teeth removal. Treatment above discussed with Dr. Wilmar Cox.       PROCEDURE: Soaked 4x4 gauze with saline and placed at sites #17 and 32 and instructed patients to stay biting for the next 30minutes then remove the gauze. Patient was evaluated 1hour later. Patient achieved hemostasis.      RECOMMENDATIONS:   1) Dental F/U with outpatient dentist for comprehensive dental care.       Anderson Mckeon DDS #[id number]  Radha Quiros DDS #[id number]   Patient is a 21y old  Female who presents with a chief complaint of bleeding and pain from extraction of her wisdom teeth.     MEDICATIONS  (STANDING):  FIRST- Mouthwash  BLM 10 milliLiter(s) Swish and Spit Once    MEDICATIONS  (PRN):      Allergies    ibuprofen (Hives)  shellfish (Unknown)  Tylenol (Angioedema)    Intolerances        FAMILY HISTORY:  No pertinent family history in first degree relatives        *SOCIAL HISTORY: (guardian or who pt came with), (smoking hx)    *Last Dental Visit:: 7/8/2022 at [facility] dental clinic     Vital Signs Last 24 Hrs  T(C): 36.7 (08 Jul 2022 16:53), Max: 36.7 (08 Jul 2022 16:53)  T(F): 98 (08 Jul 2022 16:53), Max: 98 (08 Jul 2022 16:53)  HR: 89 (08 Jul 2022 16:53) (89 - 89)  BP: 120/78 (08 Jul 2022 16:53) (120/78 - 120/78)  BP(mean): --  RR: 18 (08 Jul 2022 16:53) (18 - 18)  SpO2: 97% (08 Jul 2022 16:53) (97% - 97%)    Parameters below as of 08 Jul 2022 16:53  Patient On (Oxygen Delivery Method): room air    SUBJECTIVE FINDINGS:  Patient reports with discomfort, bleeding and falling out of sutures in her wisdom teeth extraction sites #1,16,17,32.    ASSESSMENT:  Bedside exam completed. Upon clinical examination, there were no significant bleeding, swelling or infection. Patient was bleeding within normal limits of post extraction. Sutures still present at extraction sites. Discussed with patient about not necessarily needing sutures at extraction sites and whether she wants the dental residents on call to replace/re-do the sutures under local anesthesia. Patient decided to not replace the sutures. 4x4 gauze soaked with saline and placed into wisdom teeth sites on both sides and instructed patient to stay biting for the next 30minutes. Instructed patient to only to replace the gauze if bleeding still present, and to report to the provider and the dental residents will return to apply pressure with gauze. As for pain management, patient was given oxycodone and magic mouth wash. Patient expressed concerns about the appointment earlier in [facility] for her wisdom teeth removal. Patient reports that she "Woke up" during the middle of the appointment and told the dentist that she was awake and feels the pain but dentist continued to proceed the procedure disregarding the patient's complaint. Patient also stated with showing dental residents a picture of her dental radiogrph taken 1week ago on tooth #14 which is grossly carious, stating that the tooth was chipped/broke during the extraction appointment done at [facility] on 7/8/2022. Discussed with patient that tooth #14 is a compromised non restorable tooth according to the xray she showed us on her phone and that its prone to chipping given its carious status. Treatment above discussed with Dr. Wilmar Cox.       PROCEDURE: Soaked 4x4 gauze with saline and placed at sites #17 and 32 and instructed patients to stay biting for the next 30minutes then remove the gauze. Patient was evaluated 1hour later. Patient achieved hemostasis.      RECOMMENDATIONS:   1) Dental F/U with outpatient dentist for comprehensive dental care.       Anderson Mckeon DDS #[id number]  Radha Quiros DDS #[id number]

## 2022-07-08 NOTE — ED PROVIDER NOTE - NSFOLLOWUPINSTRUCTIONS_ED_ALL_ED_FT
You were seen for pain and bleeding after dental extraction. You were given pain relief with oxycodone and mouth wash + seen by a dental consult. A prescription of Magic Mouthwash was sent to [facility] in [place] and you will be given a physical copy of the prescription as well. Please follow the instructions given on the prescription.  Gauze was provided, please keep it in the oral cavity and use mouthwash. Avoid solid foods for 1-2 days and try to stay on a liquid diet as discussed in the ED. Follow up with your dentist in one week. If your symptoms worsen, Please call your dentist or return to the ED if necessary.

## 2022-07-08 NOTE — ED PROVIDER NOTE - OBJECTIVE STATEMENT
Patient is 21 y.o female complaining of pain due to oral stiches falling out post extraction. Patient had right sided wisdom tooth extraction done at [facility] at 2pm. She states there is a sharp pain 11/10 on the entire surface area of the mouth and has vomited due to nausea. Patient denies fever or chiils.

## 2022-07-08 NOTE — ED PROVIDER NOTE - ATTENDING CONTRIBUTION TO CARE
Impression: Presbyopia: H52.4. Plan: Discussed diagnosis in detail with patient. New glasses Rx was given today. Recommend UV protection. Potential for initial adaptation discussed. Schedule dilation. Pt s/p 3 teeth extraction today with bleeding and pain at site and concern about stitch dehicience.  Pt appears well, nontoxic, R lower mandibular molar extraction site with some bleeding and incision site seen with minimal venous bleeding.  No purulence. Consulted dentistry to see patient and determine if stitches or other interventions required...offered stitches but not necessary and shared decision making with patient done and pt declined stitches, dentistry placed gauze in mouth, no active bleeding, pain controllable after oxycodone 5mg given here.      Spoke to pt's mother on phone with patient and dentistry, advised on diet, medication use, ordering magic mouthwash, stable for dc home.

## 2022-07-08 NOTE — ED PROVIDER NOTE - CARE PLAN
Assessment and plan of treatment:	1. Oral stiches displaced s/p extraction   - Dental  was consulted and will come see the patient   -Patient given 5mg Oxycodone for pain management as per dental prescription   1 Principal Discharge DX:	Pain, dental  Assessment and plan of treatment:	1. Oral stiches displaced s/p extraction   - Dental  was consulted and will come see the patient   -Patient given 5mg Oxycodone for pain management as per dental prescription

## 2022-07-08 NOTE — ED PROVIDER NOTE - PATIENT PORTAL LINK FT
You can access the FollowMyHealth Patient Portal offered by [facility] by registering at the following website: [email or web address]. By joining [employer]’s FollowMyHealth portal, you will also be able to view your health information using other applications (apps) compatible with our system.

## 2022-07-08 NOTE — CONSULT NOTE ADULT - CONSULT REASON
Called [place].  lmom    
LMOM  At [facility] today, difficult to reach with call back,  Will try again.    
PLEASE CALL PT RE; POST OP ABLATION SITUATION, HE HAS QUESTIONS, THANK YOU  
Spoke with Pollo,  Advised s was ok to do typical stairs (eight to his house) and basic ADL post procedure.  His follow up would be the one scheduled with Lo,  He should feel free to contact me if he has other concerns.  Per Dr. Solano he can resume his running 1 week after ablation.    
Tried again to reach  [facility] he could call back the Doctor on call if he needed answer tonight.  Or I would call him tomorrow.   
Evaluate post op surgical sites

## 2022-07-08 NOTE — ED PROVIDER NOTE - PROGRESS NOTE DETAILS
Jesus Manuel PGY1  Consulted dental given patient just had wisdom teeth extracted. Patient will be seen shortly.

## 2022-07-08 NOTE — ED ADULT NURSE NOTE - OBJECTIVE STATEMENT
21 year old female presented to ED with c/o of mouth pain after right side wisdom tooth extraction performed at [facility] at 2pm. pt reports stiches 'falling out', reports sharp pain to right side of mouth. pt denies CP, SOB, nausea/vomiting, numbness/tingling, fever, cough, chills, dizziness, headache, blurred vision, neuro intact. pt a&ox3, lung sounds clear, heart rate regular, abdomen soft nontender nondistended to palp. skin intact. Will continue to monitor and assess while offering support and reassurance.

## 2022-07-08 NOTE — ED ADULT TRIAGE NOTE - TEMPERATURE IN FAHRENHEIT (DEGREES F)
Total Number Of Aks Treated: 13
98
Post-Care Instructions: Patient is to wear sunprotection, and avoid picking at any of the treated lesions. Pt may apply Vaseline to crusted or scabbing areas.
Duration Of Freeze Thaw-Cycle (Seconds): 0
Detail Level: Zone
Render Post-Care Instructions In Note?: no
Consent: The patient's consent was obtained including but not limited to risks of crusting, scabbing, blistering, scarring, darker or lighter pigmentary change, recurrence, incomplete removal and infection.

## 2022-07-08 NOTE — ED PROVIDER NOTE - PLAN OF CARE
1. Oral stiches displaced s/p extraction   - Dental  was consulted and will come see the patient   -Patient given 5mg Oxycodone for pain management as per dental prescription

## 2022-08-05 ENCOUNTER — EMERGENCY (EMERGENCY)
Facility: HOSPITAL | Age: 22
LOS: 1 days | Discharge: HOME | End: 2022-08-05
Attending: STUDENT IN AN ORGANIZED HEALTH CARE EDUCATION/TRAINING PROGRAM | Admitting: STUDENT IN AN ORGANIZED HEALTH CARE EDUCATION/TRAINING PROGRAM

## 2022-08-05 VITALS
DIASTOLIC BLOOD PRESSURE: 63 MMHG | HEART RATE: 60 BPM | OXYGEN SATURATION: 100 % | RESPIRATION RATE: 16 BRPM | SYSTOLIC BLOOD PRESSURE: 105 MMHG

## 2022-08-05 VITALS
HEART RATE: 74 BPM | SYSTOLIC BLOOD PRESSURE: 132 MMHG | RESPIRATION RATE: 16 BRPM | OXYGEN SATURATION: 100 % | TEMPERATURE: 99 F | DIASTOLIC BLOOD PRESSURE: 90 MMHG | HEIGHT: 60 IN

## 2022-08-05 PROCEDURE — 99283 EMERGENCY DEPT VISIT LOW MDM: CPT

## 2022-08-05 RX ORDER — GABAPENTIN 400 MG/1
100 CAPSULE ORAL ONCE
Refills: 0 | Status: COMPLETED | OUTPATIENT
Start: 2022-08-05 | End: 2022-08-05

## 2022-08-05 RX ORDER — OXYCODONE HYDROCHLORIDE 5 MG/1
5 TABLET ORAL ONCE
Refills: 0 | Status: DISCONTINUED | OUTPATIENT
Start: 2022-08-05 | End: 2022-08-05

## 2022-08-05 RX ORDER — GABAPENTIN 400 MG/1
1 CAPSULE ORAL
Qty: 21 | Refills: 0
Start: 2022-08-05 | End: 2022-08-11

## 2022-08-05 RX ORDER — OXYCODONE HYDROCHLORIDE 5 MG/1
1 TABLET ORAL
Qty: 9 | Refills: 0
Start: 2022-08-05 | End: 2022-08-07

## 2022-08-05 RX ADMIN — GABAPENTIN 100 MILLIGRAM(S): 400 CAPSULE ORAL at 04:35

## 2022-08-05 RX ADMIN — OXYCODONE HYDROCHLORIDE 5 MILLIGRAM(S): 5 TABLET ORAL at 04:39

## 2022-08-05 NOTE — ED ADULT NURSE NOTE - NSIMPLEMENTINTERV_GEN_ALL_ED
Implemented All Universal Safety Interventions:  [place] to call system. Call bell, personal items and telephone within reach. Instruct patient to call for assistance. Room bathroom lighting operational. Non-slip footwear when patient is off stretcher. Physically safe environment: no spills, clutter or unnecessary equipment. Stretcher in lowest position, wheels locked, appropriate side rails in place.

## 2022-08-05 NOTE — ED PROVIDER NOTE - NS ED ROS FT
denies fever, chills, chest pain, SOB, abdominal pain, diarrhea, dysuria, syncope, bleeding, new rash,weakness, numbness, blurred vision  + tooth pain   ROS  otherwise negative as per HPI

## 2022-08-05 NOTE — ED ADULT TRIAGE NOTE - CHIEF COMPLAINT QUOTE
C/o nausea and pain on right side of jaw, now radiating to whole face and head, 1x day. Pt had wisdom teeth taken out 3 wks ago. States nausea started when she took oxycodone and amoxicillin at 12AM. Appears uncomfortable. PMH asthma

## 2022-08-05 NOTE — ED PROVIDER NOTE - OBJECTIVE STATEMENT
20 y/o F with PMH of asthma recent dental extraction 3 weeks prior p/w sudden onset sharp lancing pain in the right lower jaw at site of dental extraction. pt states he was eating mashed potatoes when she had the pain. Denies fever, chills, nausea, vomiting. States she has been on perocet for the pain but has not had any recently. Pain was lancing located in right jaw. No induration. Pt lmp was 2 weeks prior

## 2022-08-05 NOTE — ED PROVIDER NOTE - CLINICAL SUMMARY MEDICAL DECISION MAKING FREE TEXT BOX
20 y/o F with PMH of asthma recent dental extraction 3 weeks prior p/w sudden onset sharp lancing pain in the right lower jaw at site of dental extraction.  pt w/ dental pain at site of extraction, no fever, no chills. no signs of infection, allergy to tylenol and motrin, counselled on pain medications and follow up with give gabapentin for nerve pain and prn oxycodone x 3 days. pt will f/u with dental tomorrow

## 2022-08-05 NOTE — ED PROVIDER NOTE - NSFOLLOWUPINSTRUCTIONS_ED_ALL_ED_FT
You were seen in the Emergency Department for pain after dental extraction. Follow up with dental as discussed.      1) Continue all previously prescribed medications as directed.    2) Follow up with your primary care physician - take copies of your results.    3) Return to the Emergency Department for worsening or persistent symptoms, and/or ANY NEW OR CONCERNING SYMPTOMS.

## 2022-08-05 NOTE — ED PROVIDER NOTE - PHYSICAL EXAMINATION
Gen: Awake, Alert, WD, WN, NAD  Head:  NC/AT  Eyes:  PERRL, EOMI, Conjunctiva pink, lids normal, no scleral icterus  ENT: OP clear,  moist mucus membranes, post extraction extraction site w/o drainage   Neck: supple, nontender, no meningismus, no JVD, trachea midline  Cardiac/CV:  S1 S2, RRR, no M/G/R  Respiratory/Pulm:  CTAB,   Gastrointestinal/Abdomen:  Soft, nontender, nondistended, +BS, no rebound/guarding  Back:  no CVAT, no MLT  Ext:  warm, well perfused, moving all extremities spontaneously, no peripheral edema, distal pulses intact  Skin: intact, no rash  Neuro:  AAOx3, sensation intact, motor 5/5 x 4 extremities, normal gait, speech clear Gen: Awake, Alert, WD, WN, NAD  Head:  NC/AT  Eyes:  PERRL, EOMI, Conjunctiva pink, lids normal, no scleral icterus  ENT: OP clear,  moist mucus membranes, post extraction extraction site w/o drainage   Neck: supple, nontender, no meningismus, no JVD, trachea midline  Cardiac/CV:  S1 S2, RRR, no M/G/R  Respiratory/Pulm:  CTAB,   Gastrointestinal/Abdomen:  Soft, nontender, nondistended, +BS, no rebound/guarding  Back:  no CVAT, no MLT  Ext:  warm, well perfused, moving all extremities spontaneously, no peripheral edema, distal pulses intact  Skin: intact, no rash  Neuro:  AAOx3, sensation intact, motor 5/5 x 4 extremities, normal gait, speech clear    HALLIE Barker (PGY-3) - mild tenderness of the extraction socket. No drainage or abscess.

## 2022-08-05 NOTE — ED PROVIDER NOTE - ATTENDING CONTRIBUTION TO CARE
attending wrote note  Dr. Hernandez: I have personally seen and examined this patient at the bedside. I have fully participated in the care of this patient. I have reviewed all pertinent clinical information, including history, physical exam, plan and the Resident's note and agree except as noted. HPI above as by me. PE above as by me. DDX PLAN

## 2022-08-05 NOTE — ED ADULT NURSE NOTE - OBJECTIVE STATEMENT
Pt received to room 5. Pt is A&Ox4, ambulatory at baseline. Pt is c/o R sided jaw pain. States she had her wisdom teeth removed 3 weeks ago. Pt also endorses swelling to the area earlier in the night, no swelling present at this time. Denies any fevers, chills, n/v/d, Pt well appearing, in NAD. Medicated as per MAR. VSS. RR even and unlabored. Safety measures in place

## 2022-08-05 NOTE — ED PROVIDER NOTE - PATIENT PORTAL LINK FT
You can access the FollowMyHealth Patient Portal offered by [facility] by registering at the following website: [email or web address]. By joining [employer]’s FollowMyHealth portal, you will also be able to view your health information using other applications (apps) compatible with our system.

## 2023-02-09 ENCOUNTER — EMERGENCY (EMERGENCY)
Facility: HOSPITAL | Age: 23
LOS: 0 days | Discharge: HOME | End: 2023-02-09
Attending: STUDENT IN AN ORGANIZED HEALTH CARE EDUCATION/TRAINING PROGRAM
Payer: MEDICAID

## 2023-02-09 VITALS
DIASTOLIC BLOOD PRESSURE: 66 MMHG | HEIGHT: 61 IN | RESPIRATION RATE: 16 BRPM | OXYGEN SATURATION: 97 % | SYSTOLIC BLOOD PRESSURE: 97 MMHG | HEART RATE: 85 BPM | WEIGHT: 145.06 LBS | TEMPERATURE: 98 F

## 2023-02-09 DIAGNOSIS — J45.909 UNSPECIFIED ASTHMA, UNCOMPLICATED: ICD-10-CM

## 2023-02-09 DIAGNOSIS — Z91.013 ALLERGY TO SEAFOOD: ICD-10-CM
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
## 2023-02-09 DIAGNOSIS — Z88.6 ALLERGY STATUS TO ANALGESIC AGENT: ICD-10-CM

## 2023-02-09 DIAGNOSIS — M54.6 PAIN IN THORACIC SPINE: ICD-10-CM
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
## 2023-02-09 PROCEDURE — 72110 X-RAY EXAM L-2 SPINE 4/>VWS: CPT | Mod: 26

## 2023-02-09 PROCEDURE — 72070 X-RAY EXAM THORAC SPINE 2VWS: CPT | Mod: 26

## 2023-02-09 PROCEDURE — 99284 EMERGENCY DEPT VISIT MOD MDM: CPT

## 2023-02-09 RX ORDER — METHOCARBAMOL 500 MG/1
750 TABLET, FILM COATED ORAL ONCE
Refills: 0 | Status: COMPLETED | OUTPATIENT
Start: 2023-02-09 | End: 2023-02-09

## 2023-02-09 RX ORDER — LIDOCAINE 4 G/100G
1 CREAM TOPICAL ONCE
Refills: 0 | Status: COMPLETED | OUTPATIENT
Start: 2023-02-09 | End: 2023-02-09

## 2023-02-09 RX ORDER — METHOCARBAMOL 500 MG/1
1 TABLET, FILM COATED ORAL
Qty: 10 | Refills: 0
Start: 2023-02-09 | End: 2023-02-13

## 2023-02-09 RX ADMIN — LIDOCAINE 1 PATCH: 4 CREAM TOPICAL at 20:53

## 2023-02-09 RX ADMIN — METHOCARBAMOL 750 MILLIGRAM(S): 500 TABLET, FILM COATED ORAL at 20:54

## 2023-02-09 NOTE — ED ADULT TRIAGE NOTE - CHIEF COMPLAINT QUOTE
slip and fall on stairs yesterday sliding down 22 stairs up, complaining of mid back pain, patient states has had some tingling on bilateral shoulders since yesterday, denies hitting head, denies loc  hx of asthma, scoliosis

## 2023-02-09 NOTE — ED PROVIDER NOTE - OBJECTIVE STATEMENT
22 y.o. female presents to the ED w/ c/o upper to mid back pain following a fall down the stairs that occurred yesterday afternoon.  Pt states she slipped indoors and hit her back on one step while sliding down the remaining 15 to 20 steps.  Denies head strike or neck injury.  Able to ambulate following injury.  She reports pain is worse today than yesterday.  Has not been able to take anything for pain b/c of ibuprofen allergy and tylenol sensitivity.  No fever, chills, N/V/D, dizziness, headache, SOB, chest pain, abdominal pain, or syncope.

## 2023-02-09 NOTE — ED PROVIDER NOTE - ATTENDING CONTRIBUTION TO CARE
Frantz: Pt seen w/ ACP fellow, 22F PMH asthma (PSH none, Allergy Motrin / Tylenol, Shrimp, Meds Albuterol PRN, on menses currently) pw mid-back pain s/p slip and fall down flight of stairs yesterday. Pt reports mild pain s/p injury, but w/ gradual worsening stiffness / soreness today. Pt unable to take OTC Tylenol / Motrin 2/2 allergy. Mother advised pt go to ED r/o fx w/ XR. Denies head strike, no LOC. Denies numbness / weakness / tingling in UE / LE. Pt able to ambulate. AF, VSS. Well appearing, in NAD. Exam as noted in PE. Agree w/ planned w/u. Frantz: Pt seen w/ ACP fellow, 22F PMH asthma (PSH none, Allergy Motrin / Tylenol, Shrimp, Meds Albuterol PRN, on menses currently) pw mid-back pain s/p slip and fall down flight of stairs yesterday. Pt reports mild pain s/p injury, but w/ gradual worsening stiffness / soreness today. Pt unable to take OTC Tylenol / Motrin 2/2 allergy. Mother advised pt go to ED r/o fx w/ XR. Denies head strike, no LOC. Denies numbness / weakness / tingling in UE / LE. Pt able to ambulate. AF, VSS. Well appearing, in NAD. Exam as noted in PE. Agree w/ planned w/u.  XR imaging negative for acute injury. On re-eval, resting comfortably, in NAD. Stable for d/c home. Given script for Robaxin. Recommend outpatient PCP f/u. Return signs / symptoms d/w pt. She understands / agrees w/ this plan.

## 2023-02-09 NOTE — ED PROVIDER NOTE - CLINICAL SUMMARY MEDICAL DECISION MAKING FREE TEXT BOX
22 y.o. female presents to the ED w/ c/o upper to mid back pain following a fall down the stairs that occurred yesterday afternoon.  +paraspinal ttp of thoracic and lumbar spine on exam.  Pain likely MSK.  Will obtain xrays to eval for fracture, give meds for pain, and d/c if improved.

## 2023-02-09 NOTE — ED PROVIDER NOTE - PHYSICAL EXAMINATION
GEN: Pt in NAD, A&O x4  HEAD: Head NCAT, Neck supple FROM. no cervical midline ttp.  EYES: Sclera white w/o injection. PERRL. EOMI.  ENT: No nasal d/c. MMM.   RESP: CTA b/l, no wheezes, rales, or rhonchi.   CARDIAC: RRR, clear distinct S1, S2, no appreciable murmurs.  ABD: Abdomen soft, non-tender. bowel sounds x 4 quadrants  MSK: No obvious spinal deformity, + thoracic paraspinal ttp, no step-offs. FROM b/l UE and LE w/o pain.  VASC: 2+ radial pulses and dorsalis pedis b/l.  SKIN: No rashes, lacerations, or ecchymoses on the trunk.

## 2023-02-09 NOTE — ED PROVIDER NOTE - PATIENT PORTAL LINK FT
You can access the FollowMyHealth Patient Portal offered by [facility] by registering at the following website: [email or web address]. By joining [employer]’s FollowMyHealth portal, you will also be able to view your health information using other applications (apps) compatible with our system.

## 2023-02-09 NOTE — ED PROVIDER NOTE - NSFOLLOWUPINSTRUCTIONS_ED_ALL_ED_FT
You were seen in the emergency department for back pain following a fall.  You had xrays and received medications. The results were discussed with you by your provider and a copy has been included in your discharge paperwork.  Please read all additional instructions below, and follow-up with all providers as directed.    1) Follow-up with your primary care provider in 1-2 days.    2) Continue to take all medications as prescribed.    3) Rest and stay hydrated. Pain can be managed with Acetaminophen (aka Tylenol) and Ibuprofen (aka Motrin or Advil) over the counter as directed.    4) Return to the ER for any new or worsening symptoms.      Please read all attached patient information.
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
## 2024-04-03 ENCOUNTER — EMERGENCY (EMERGENCY)
Facility: HOSPITAL | Age: 24
LOS: 1 days | Discharge: HOME | End: 2024-04-03
Admitting: EMERGENCY MEDICINE
Payer: COMMERCIAL

## 2024-04-03 VITALS
TEMPERATURE: 98 F | DIASTOLIC BLOOD PRESSURE: 66 MMHG | RESPIRATION RATE: 16 BRPM | HEART RATE: 75 BPM | SYSTOLIC BLOOD PRESSURE: 101 MMHG | OXYGEN SATURATION: 97 %

## 2024-04-03 PROCEDURE — 26750 TREAT FINGER FRACTURE EACH: CPT | Mod: 54,F2

## 2024-04-03 PROCEDURE — 73130 X-RAY EXAM OF HAND: CPT | Mod: 26,LT

## 2024-04-03 PROCEDURE — 99284 EMERGENCY DEPT VISIT MOD MDM: CPT | Mod: 57
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
## 2024-04-03 NOTE — ED PROVIDER NOTE - PATIENT PORTAL LINK FT
You can access the FollowMyHealth Patient Portal offered by [facility] by registering at the following website: [email or web address]. By joining [employer]’s FollowMyHealth portal, you will also be able to view your health information using other applications (apps) compatible with our system.
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
## 2024-04-03 NOTE — ED PROVIDER NOTE - NSFOLLOWUPINSTRUCTIONS_ED_ALL_ED_FT
Your X-rays do not show any fracture. Follow up with a hand specialist to evaluate for any potential tendon damage. You can schedule follow up with Dr. Lisa Elizabeth [address] [place] [address] [address] [facility] [address]. [phone].  Continue all previously prescribed medications as directed.  Follow up with your primary care physician in 48-72 hours- bring copies of your results.  Return to the ER for worsening or persistent symptoms, and/or ANY NEW OR CONCERNING SYMPTOMS. If you have issues obtaining follow up, please call: [phone] to obtain a doctor or specialist who takes your insurance in your area.  You may call [phone] to make an appointment with the internal medicine clinic. You have a non-displaced fracture of the middle finger of your left hand. Use the aluminum finger splint as applied. Follow up with a hand specialist for further evaluation of your fracture. You can schedule follow up with Dr. Lisa Elizabeth [address] [place] [address] [address] [facility] [address]. [phone].  Continue all previously prescribed medications as directed.  Follow up with your primary care physician in 48-72 hours- bring copies of your results.  Return to the ER for worsening or persistent symptoms, and/or ANY NEW OR CONCERNING SYMPTOMS. THIS INCLUDES BUT IS NOT LIMITED TO NUMBNESS, WEAKNESS, SEVERE PAIN OR FOR ANY OTHER SYMPTOMS THAT CONCERN YOU. If you have issues obtaining follow up, please call: [phone] to obtain a doctor or specialist who takes your insurance in your area.  You may call [phone] to make an appointment with the internal medicine clinic.

## 2024-04-03 NOTE — ED ADULT TRIAGE NOTE - NS ED TRIAGE AVPU SCALE
Alert-The patient is alert, awake and responds to voice. The patient is oriented to time, place, and person. The triage nurse is able to obtain subjective information. PAWAN on CKD, back pain intractable back pain found to have worsened kidney function from baseline

## 2024-04-03 NOTE — ED PROVIDER NOTE - CARE PLAN
1 Principal Discharge DX:	Sprain of left middle finger   Principal Discharge DX:	Nondisplaced fracture of phalanx of finger of left hand

## 2024-04-03 NOTE — ED PROVIDER NOTE - MUSCULOSKELETAL MINIMAL EXAM
Flexion of LUE 3rd digit is limited by pain. No crepitus. + ecchymosis. Intact sensation to light touch, cap refill <2 seconds.

## 2024-04-04 ENCOUNTER — APPOINTMENT (OUTPATIENT)
Dept: ORTHOPEDIC SURGERY | Facility: CLINIC | Age: 24
End: 2024-04-04
Payer: COMMERCIAL

## 2024-04-04 VITALS
HEART RATE: 62 BPM | WEIGHT: 150 LBS | HEIGHT: 61 IN | DIASTOLIC BLOOD PRESSURE: 69 MMHG | SYSTOLIC BLOOD PRESSURE: 102 MMHG | BODY MASS INDEX: 28.32 KG/M2

## 2024-04-04 DIAGNOSIS — S62.663A NONDISPLACED FRACTURE OF DISTAL PHALANX OF LEFT MIDDLE FINGER, INITIAL ENCOUNTER FOR CLOSED FRACTURE: ICD-10-CM

## 2024-04-04 PROCEDURE — 99203 OFFICE O/P NEW LOW 30 MIN: CPT | Mod: 25

## 2024-04-04 PROCEDURE — 29130 APPL FINGER SPLINT STATIC: CPT | Mod: LT

## 2024-04-04 NOTE — PHYSICAL EXAM
[de-identified] :  - Constitutional: This is a female in no obvious distress.   - Psych: Patient is alert and oriented to person, place and time.  Patient has a normal mood and affect. - Cardiovascular: Normal pulses throughout the upper extremities.  No significant varicosities are noted in the upper extremities.  - Neuro: Strength and sensation are intact throughout the upper extremities.  Patient has normal coordination. - Respiratory:  Patient exhibits no evidence of shortness of breath or difficulty breathing. - Skin: No rashes, lesions, or other abnormalities are noted in the upper extremities. ---  Examination of her left middle finger demonstrates swelling and tenderness along the radial base of the distal phalanx at the insertion of the radial collateral ligament.  She has full extension with some limitation of flexion.  There is no instability of the DIP joint collateral ligaments.  She is neurovascularly intact distally. [de-identified] : I reviewed x-rays of the left hand dated 4/3/24 which demonstrate a non-displaced fracture of the middle finger distal phalanx at the radial base at the insertion of the radial collateral ligament.
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
## 2024-04-04 NOTE — ADDENDUM
[FreeTextEntry1] : I, Leeroy Alcantar, acted solely as a scribe for Dr. Charles on this date on 04/04/2024.
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
## 2024-04-04 NOTE — END OF VISIT
[FreeTextEntry3] : This note was written by Leeroy Alcantar on 04/04/2024 acting solely as a scribe for Dr. Je Charlse.   All medical record entries made by the Scribe were at my, Dr. Je Charles, direction and personally dictated by me on 04/04/2024. I have personally reviewed the chart and agree that the record accurately reflects my personal performance of the history, physical exam, assessment and plan.

## 2024-05-12 ENCOUNTER — EMERGENCY (EMERGENCY)
Facility: HOSPITAL | Age: 24
LOS: 1 days | Discharge: HOME | End: 2024-05-12
Attending: STUDENT IN AN ORGANIZED HEALTH CARE EDUCATION/TRAINING PROGRAM | Admitting: STUDENT IN AN ORGANIZED HEALTH CARE EDUCATION/TRAINING PROGRAM
Payer: COMMERCIAL

## 2024-05-12 VITALS
RESPIRATION RATE: 18 BRPM | HEART RATE: 80 BPM | SYSTOLIC BLOOD PRESSURE: 118 MMHG | OXYGEN SATURATION: 100 % | DIASTOLIC BLOOD PRESSURE: 81 MMHG | TEMPERATURE: 98 F

## 2024-05-12 PROCEDURE — 99284 EMERGENCY DEPT VISIT MOD MDM: CPT

## 2024-05-12 NOTE — ED PROVIDER NOTE - CLINICAL SUMMARY MEDICAL DECISION MAKING FREE TEXT BOX
24 yo female for evaluation of vaginal bleeding episode today LMP 4/28/24. States no longer bleeding at this time. Also reports she noted some blood streaks with blowing nose. A/P Labs, medicate, reassess

## 2024-05-12 NOTE — ED ADULT TRIAGE NOTE - CHIEF COMPLAINT QUOTE
c/o one episode of vaginal bleeding today, states also had epistaxis at the same time. Bleeding resolved at present. Denies pain and blood thinner use. LMP 4/28. Denies pmhx

## 2024-05-12 NOTE — ED PROVIDER NOTE - NSFOLLOWUPINSTRUCTIONS_ED_ALL_ED_FT
Please return to Emergency Department immediately for any new, concerning, or worsening symptoms.   Please follow-up with Gyn as recommended.    Any results obtained today during your evaluation is attached and available in your portal. Please take all your results to follow up with your primary care doctor so that they can determine if you need any additional testing or treatment as an outpatient.

## 2024-05-12 NOTE — ED PROVIDER NOTE - OBJECTIVE STATEMENT
22 yo female 24 yo female for evaluation of vaginal bleeding episode today LMP 4/28/24. States no longer bleeding at this time. Also reports she noted some blood streaks with blowing nose. Denies any PMH or h/o clotting/bleeding disorders. nOt on any AC at this time

## 2024-05-12 NOTE — ED PROVIDER NOTE - NSFOLLOWUPCLINICS_GEN_ALL_ED_FT
[facility] Gynecology and Obstetrics  Gynceology/OB  [address] [place] [address]  Phone: [phone]  Fax:

## 2024-05-13 LAB
ALBUMIN SERPL ELPH-MCNC: 4.1 G/DL — SIGNIFICANT CHANGE UP (ref 3.3–5)
ALP SERPL-CCNC: 83 U/L — SIGNIFICANT CHANGE UP (ref 40–120)
ALT FLD-CCNC: 23 U/L — SIGNIFICANT CHANGE UP (ref 4–33)
ANION GAP SERPL CALC-SCNC: 11 MMOL/L — SIGNIFICANT CHANGE UP (ref 7–14)
APPEARANCE UR: CLEAR — SIGNIFICANT CHANGE UP
APTT BLD: 28.6 SEC — SIGNIFICANT CHANGE UP (ref 24.5–35.6)
AST SERPL-CCNC: 29 U/L — SIGNIFICANT CHANGE UP (ref 4–32)
BACTERIA # UR AUTO: ABNORMAL /HPF
BASOPHILS # BLD AUTO: 0.04 K/UL — SIGNIFICANT CHANGE UP (ref 0–0.2)
BASOPHILS NFR BLD AUTO: 0.3 % — SIGNIFICANT CHANGE UP (ref 0–2)
BILIRUB SERPL-MCNC: 0.4 MG/DL — SIGNIFICANT CHANGE UP (ref 0.2–1.2)
BILIRUB UR-MCNC: NEGATIVE — SIGNIFICANT CHANGE UP
BLD GP AB SCN SERPL QL: NEGATIVE — SIGNIFICANT CHANGE UP
BUN SERPL-MCNC: 18 MG/DL — SIGNIFICANT CHANGE UP (ref 7–23)
CALCIUM SERPL-MCNC: 9.6 MG/DL — SIGNIFICANT CHANGE UP (ref 8.4–10.5)
CAST: 0 /LPF — SIGNIFICANT CHANGE UP (ref 0–4)
CHLORIDE SERPL-SCNC: 105 MMOL/L — SIGNIFICANT CHANGE UP (ref 98–107)
CO2 SERPL-SCNC: 24 MMOL/L — SIGNIFICANT CHANGE UP (ref 22–31)
COLOR SPEC: YELLOW — SIGNIFICANT CHANGE UP
CREAT SERPL-MCNC: 0.93 MG/DL — SIGNIFICANT CHANGE UP (ref 0.5–1.3)
DIFF PNL FLD: ABNORMAL
EGFR: 87 ML/MIN/1.73M2 — SIGNIFICANT CHANGE UP
EOSINOPHIL # BLD AUTO: 0.28 K/UL — SIGNIFICANT CHANGE UP (ref 0–0.5)
EOSINOPHIL NFR BLD AUTO: 2.4 % — SIGNIFICANT CHANGE UP (ref 0–6)
GLUCOSE SERPL-MCNC: 72 MG/DL — SIGNIFICANT CHANGE UP (ref 70–99)
GLUCOSE UR QL: NEGATIVE MG/DL — SIGNIFICANT CHANGE UP
HCG SERPL-ACNC: <1 MIU/ML — SIGNIFICANT CHANGE UP
HCT VFR BLD CALC: 34 % — LOW (ref 34.5–45)
HCV AB S/CO SERPL IA: 0.11 S/CO — SIGNIFICANT CHANGE UP (ref 0–0.99)
HCV AB SERPL-IMP: SIGNIFICANT CHANGE UP
HGB BLD-MCNC: 11.1 G/DL — LOW (ref 11.5–15.5)
HIV 1+2 AB+HIV1 P24 AG SERPL QL IA: SIGNIFICANT CHANGE UP
IANC: 7.49 K/UL — HIGH (ref 1.8–7.4)
IMM GRANULOCYTES NFR BLD AUTO: 0.3 % — SIGNIFICANT CHANGE UP (ref 0–0.9)
INR BLD: 0.95 RATIO — SIGNIFICANT CHANGE UP (ref 0.85–1.18)
KETONES UR-MCNC: ABNORMAL MG/DL
LEUKOCYTE ESTERASE UR-ACNC: NEGATIVE — SIGNIFICANT CHANGE UP
LYMPHOCYTES # BLD AUTO: 2.53 K/UL — SIGNIFICANT CHANGE UP (ref 1–3.3)
LYMPHOCYTES # BLD AUTO: 22 % — SIGNIFICANT CHANGE UP (ref 13–44)
MCHC RBC-ENTMCNC: 28.8 PG — SIGNIFICANT CHANGE UP (ref 27–34)
MCHC RBC-ENTMCNC: 32.6 GM/DL — SIGNIFICANT CHANGE UP (ref 32–36)
MCV RBC AUTO: 88.1 FL — SIGNIFICANT CHANGE UP (ref 80–100)
MONOCYTES # BLD AUTO: 1.13 K/UL — HIGH (ref 0–0.9)
MONOCYTES NFR BLD AUTO: 9.8 % — SIGNIFICANT CHANGE UP (ref 2–14)
NEUTROPHILS # BLD AUTO: 7.49 K/UL — HIGH (ref 1.8–7.4)
NEUTROPHILS NFR BLD AUTO: 65.2 % — SIGNIFICANT CHANGE UP (ref 43–77)
NITRITE UR-MCNC: NEGATIVE — SIGNIFICANT CHANGE UP
NRBC # BLD: 0 /100 WBCS — SIGNIFICANT CHANGE UP (ref 0–0)
NRBC # FLD: 0 K/UL — SIGNIFICANT CHANGE UP (ref 0–0)
PH UR: 5.5 — SIGNIFICANT CHANGE UP (ref 5–8)
PLATELET # BLD AUTO: 268 K/UL — SIGNIFICANT CHANGE UP (ref 150–400)
POTASSIUM SERPL-MCNC: 4.2 MMOL/L — SIGNIFICANT CHANGE UP (ref 3.5–5.3)
POTASSIUM SERPL-SCNC: 4.2 MMOL/L — SIGNIFICANT CHANGE UP (ref 3.5–5.3)
PROT SERPL-MCNC: 7.3 G/DL — SIGNIFICANT CHANGE UP (ref 6–8.3)
PROT UR-MCNC: SIGNIFICANT CHANGE UP MG/DL
PROTHROM AB SERPL-ACNC: 10.7 SEC — SIGNIFICANT CHANGE UP (ref 9.5–13)
RBC # BLD: 3.86 M/UL — SIGNIFICANT CHANGE UP (ref 3.8–5.2)
RBC # FLD: 13.6 % — SIGNIFICANT CHANGE UP (ref 10.3–14.5)
RBC CASTS # UR COMP ASSIST: 17 /HPF — HIGH (ref 0–4)
RH IG SCN BLD-IMP: POSITIVE — SIGNIFICANT CHANGE UP
SODIUM SERPL-SCNC: 140 MMOL/L — SIGNIFICANT CHANGE UP (ref 135–145)
SP GR SPEC: 1.03 — HIGH (ref 1–1.03)
SQUAMOUS # UR AUTO: 9 /HPF — HIGH (ref 0–5)
TSH SERPL-MCNC: 1.08 UIU/ML — SIGNIFICANT CHANGE UP (ref 0.27–4.2)
UROBILINOGEN FLD QL: 0.2 MG/DL — SIGNIFICANT CHANGE UP (ref 0.2–1)
WBC # BLD: 11.51 K/UL — HIGH (ref 3.8–10.5)
WBC # FLD AUTO: 11.51 K/UL — HIGH (ref 3.8–10.5)
WBC UR QL: 5 /HPF — SIGNIFICANT CHANGE UP (ref 0–5)

## 2024-05-13 NOTE — ED ADULT NURSE NOTE - OBJECTIVE STATEMENT
33 year old AO4 ambulatory female c/o vaginal bleeding x 1day. PMHx of anemia. States LMP was 5/28. As per pt she has been having episoded of syncope since January where she passes out for 15 minutes. Just prior to the episodes she has a headache and some vision changes. States the last episode was on Friday. Right 20g IV placed, labs drawn. NPO status maintained. Pt safety in place. Pending lab results.

## 2024-05-14 LAB
C TRACH RRNA SPEC QL NAA+PROBE: SIGNIFICANT CHANGE UP
N GONORRHOEA RRNA SPEC QL NAA+PROBE: SIGNIFICANT CHANGE UP

## 2024-05-15 LAB
-  AMOXICILLIN/CLAVULANIC ACID: SIGNIFICANT CHANGE UP
-  AMPICILLIN/SULBACTAM: SIGNIFICANT CHANGE UP
-  AMPICILLIN: SIGNIFICANT CHANGE UP
-  AZTREONAM: SIGNIFICANT CHANGE UP
-  CEFAZOLIN: SIGNIFICANT CHANGE UP
-  CEFEPIME: SIGNIFICANT CHANGE UP
-  CEFOXITIN: SIGNIFICANT CHANGE UP
-  CEFTRIAXONE: SIGNIFICANT CHANGE UP
-  CEFUROXIME: SIGNIFICANT CHANGE UP
-  CIPROFLOXACIN: SIGNIFICANT CHANGE UP
-  ERTAPENEM: SIGNIFICANT CHANGE UP
-  GENTAMICIN: SIGNIFICANT CHANGE UP
-  IMIPENEM: SIGNIFICANT CHANGE UP
-  LEVOFLOXACIN: SIGNIFICANT CHANGE UP
-  MEROPENEM: SIGNIFICANT CHANGE UP
-  NITROFURANTOIN: SIGNIFICANT CHANGE UP
-  PIPERACILLIN/TAZOBACTAM: SIGNIFICANT CHANGE UP
-  TOBRAMYCIN: SIGNIFICANT CHANGE UP
-  TRIMETHOPRIM/SULFAMETHOXAZOLE: SIGNIFICANT CHANGE UP
CULTURE RESULTS: ABNORMAL
METHOD TYPE: SIGNIFICANT CHANGE UP
ORGANISM # SPEC MICROSCOPIC CNT: ABNORMAL
ORGANISM # SPEC MICROSCOPIC CNT: ABNORMAL
SPECIMEN SOURCE: SIGNIFICANT CHANGE UP

## 2025-01-10 NOTE — ED PROVIDER NOTE - MDM ORDERS SUBMITTED SELECTION
Conjuntivae and eyelids appear normal , Sclerae : White without injection
Imaging Studies/Medications

## 2025-01-24 ENCOUNTER — EMERGENCY (EMERGENCY)
Facility: HOSPITAL | Age: 25
LOS: 1 days | Discharge: HOME | End: 2025-01-24
Attending: STUDENT IN AN ORGANIZED HEALTH CARE EDUCATION/TRAINING PROGRAM | Admitting: STUDENT IN AN ORGANIZED HEALTH CARE EDUCATION/TRAINING PROGRAM
Payer: COMMERCIAL

## 2025-01-24 VITALS
HEART RATE: 61 BPM | RESPIRATION RATE: 16 BRPM | DIASTOLIC BLOOD PRESSURE: 77 MMHG | TEMPERATURE: 98 F | HEIGHT: 61 IN | WEIGHT: 154.98 LBS | OXYGEN SATURATION: 96 % | SYSTOLIC BLOOD PRESSURE: 113 MMHG

## 2025-01-24 VITALS
DIASTOLIC BLOOD PRESSURE: 75 MMHG | TEMPERATURE: 99 F | SYSTOLIC BLOOD PRESSURE: 111 MMHG | OXYGEN SATURATION: 100 % | HEART RATE: 64 BPM | RESPIRATION RATE: 16 BRPM

## 2025-01-24 PROCEDURE — 99284 EMERGENCY DEPT VISIT MOD MDM: CPT

## 2025-01-24 RX ORDER — BUPIVACAINE HYDROCHLORIDE 2.5 MG/ML
4 INJECTION, SOLUTION EPIDURAL; INFILTRATION; INTRACAUDAL ONCE
Refills: 0 | Status: DISCONTINUED | OUTPATIENT
Start: 2025-01-24 | End: 2025-01-28

## 2025-01-24 RX ORDER — AMOXICILLIN/POTASSIUM CLAV 875-125 MG
1 TABLET ORAL ONCE
Refills: 0 | Status: COMPLETED | OUTPATIENT
Start: 2025-01-24 | End: 2025-01-24

## 2025-01-24 RX ORDER — OXYCODONE HCL 15 MG
5 TABLET ORAL ONCE
Refills: 0 | Status: DISCONTINUED | OUTPATIENT
Start: 2025-01-24 | End: 2025-01-24

## 2025-01-24 RX ORDER — AMOXICILLIN/POTASSIUM CLAV 875-125 MG
875 TABLET ORAL
Qty: 10 | Refills: 0
Start: 2025-01-24 | End: 2025-01-28

## 2025-01-24 RX ADMIN — Medication 5 MILLIGRAM(S): at 18:53

## 2025-01-24 RX ADMIN — Medication 1 TABLET(S): at 16:02
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
## 2025-01-24 NOTE — ED PROVIDER NOTE - CLINICAL SUMMARY MEDICAL DECISION MAKING FREE TEXT BOX
Patient presents to the ED complaining of left-sided jaw pain.  Vital signs are stable and afebrile.  She has no red flag signs and is in no acute distress.  She has poor dentition, she has a complete fracture of the 14th tooth with exposed pulp.  Additionally she has fractures of the 29th and 30th tooth.  No evidence of abscess.  She has minimal tenderness over the left mandibular angle without overlying skin changes.    I suspect her pain to be empty socket versus pulpitis versus TMJ.  Patient provided verbal consent for inferior alveolar block which the patient stated helped initially but "her pain came back".  Patient to be given oxycodone and discharged with outpatient dentistry follow-up and a prescription for Augmentin.    Patient states she has an allergy to acetaminophen and ibuprofen, however she is able to tolerate Midol.  She is advised Midol at home.    Clinically I have a low suspicion for acute intracranial process, mastoiditis, giant cell arteritis, peritonsillar abscess, retropharyngeal abscess.

## 2025-01-24 NOTE — ED ADULT NURSE NOTE - CHIEF COMPLAINT QUOTE
s/p allergic reaction yesterday to Excedrin, pt c/o head/jaw pain that started X 2 days ago. says "pain starts in forehead and goes into jaw". pt unsure if dental related, says has had all 4 wisdom teeth removed. Phx anemia

## 2025-01-24 NOTE — ED PROVIDER NOTE - PATIENT PORTAL LINK FT
You can access the FollowMyHealth Patient Portal offered by [facility] by registering at the following website: [email or web address]. By joining [employer]’s FollowMyHealth portal, you will also be able to view your health information using other applications (apps) compatible with our system.

## 2025-01-24 NOTE — ED PROVIDER NOTE - OBJECTIVE STATEMENT
24-year-old female who presents to the ED complaining of frontal head pain and left-sided jaw pain for the past 2 days.  She denies any vision changes, hearing changes, numbness, weakness, chest pain shortness of breath, neck pain, tinnitus, trauma.    On outpatient chart review patient was seen yesterday in the ED for allergic reaction to Excedrin, she became nauseous and had itching which resolved after Benadryl.

## 2025-01-24 NOTE — ED ADULT NURSE NOTE - OBJECTIVE STATEMENT
A&Ox4. ambulatory.  c/o head/jaw pain that started X 2 days ago. NAD. no drooling observed and PT is talking in full sentences.  pt denies SOB, chest pain, dizziness, weakness, urinary symptoms, HA, n/v/d, fevers, chills, pain. respirations are even and un labored. skin intact. medicated as ordered. saefty precautions maintained.

## 2025-01-24 NOTE — ED PROVIDER NOTE - NSFOLLOWUPINSTRUCTIONS_ED_ALL_ED_FT
Dental Cavities    AMBULATORY CARE:    Dental cavities, also called caries, are holes in teeth caused by bacteria. The bacteria mix with carbohydrates from foods and create acids. The acids break down areas of enamel, which covers the outside of a tooth.  Tooth Decay    Common signs and symptoms: You may not have any symptoms if cavities have just started to form. When cavities reach deeper parts of your tooth, you may start to feel pain. You may also have any of the following:    Pain when you chew or eat hot or cold foods    Chalky white, yellow, or brown tooth    Gum swelling  Seek care immediately if:    You have severe pain in your tooth or jaw.    You have swelling in your jaw or cheek.  Call your dentist if:    You have a fever.    Your tooth pain gets worse.    You have questions or concerns about your condition or care.  Treatment for dental cavities may include any of the following:    A fluoride treatment may be given during dental visits, or you may use products with fluoride at home. Your dentist will tell you what kind of fluoride you need and how to use it.    A filling may be placed in your tooth after the decayed portion is removed. The filling may help to protect your tooth from more decay.    A root canal may be needed if the tooth is infected or the decay is severe.  Prevent dental cavities:    Brush your teeth at least 2 times a day with fluoride toothpaste.    Use dental floss at least once a day to clean between your teeth.    See your dentist every 6 months for dental cleanings and oral exams.    Rinse your mouth with water or mouthwash after meals and snacks. Chew sugarless gum.    Eat a variety of healthy foods. Healthy foods include fruits, vegetables, whole-grain breads, low-fat dairy products, beans, lean meats, and fish. Avoid sugary and starchy food and drinks that can stick between your teeth.  Healthy Foods  Follow up with your dentist as directed: Write down your questions so you remember to ask them during your visits.